# Patient Record
Sex: MALE | Race: WHITE | NOT HISPANIC OR LATINO | URBAN - METROPOLITAN AREA
[De-identification: names, ages, dates, MRNs, and addresses within clinical notes are randomized per-mention and may not be internally consistent; named-entity substitution may affect disease eponyms.]

---

## 2017-07-25 ENCOUNTER — OUTPATIENT (OUTPATIENT)
Dept: OUTPATIENT SERVICES | Facility: HOSPITAL | Age: 22
LOS: 1 days | End: 2017-07-25

## 2017-07-25 VITALS
HEART RATE: 85 BPM | SYSTOLIC BLOOD PRESSURE: 106 MMHG | RESPIRATION RATE: 16 BRPM | DIASTOLIC BLOOD PRESSURE: 70 MMHG | HEIGHT: 66 IN | TEMPERATURE: 98 F | OXYGEN SATURATION: 97 % | WEIGHT: 85.1 LBS

## 2017-07-25 DIAGNOSIS — G80.0 SPASTIC QUADRIPLEGIC CEREBRAL PALSY: Chronic | ICD-10-CM

## 2017-07-25 DIAGNOSIS — G80.9 CEREBRAL PALSY, UNSPECIFIED: ICD-10-CM

## 2017-07-25 DIAGNOSIS — K59.09 OTHER CONSTIPATION: ICD-10-CM

## 2017-07-25 DIAGNOSIS — G82.50 QUADRIPLEGIA, UNSPECIFIED: ICD-10-CM

## 2017-07-25 DIAGNOSIS — Z98.890 OTHER SPECIFIED POSTPROCEDURAL STATES: Chronic | ICD-10-CM

## 2017-07-25 DIAGNOSIS — Z99.3 DEPENDENCE ON WHEELCHAIR: ICD-10-CM

## 2017-07-25 DIAGNOSIS — R47.01 APHASIA: ICD-10-CM

## 2017-07-25 DIAGNOSIS — H47.619 CORTICAL BLINDNESS, UNSPECIFIED SIDE OF BRAIN: ICD-10-CM

## 2017-07-25 LAB
BUN SERPL-MCNC: 17 MG/DL — SIGNIFICANT CHANGE UP (ref 7–23)
CALCIUM SERPL-MCNC: 9.8 MG/DL — SIGNIFICANT CHANGE UP (ref 8.4–10.5)
CHLORIDE SERPL-SCNC: 100 MMOL/L — SIGNIFICANT CHANGE UP (ref 98–107)
CO2 SERPL-SCNC: 26 MMOL/L — SIGNIFICANT CHANGE UP (ref 22–31)
CREAT SERPL-MCNC: 0.57 MG/DL — SIGNIFICANT CHANGE UP (ref 0.5–1.3)
GLUCOSE SERPL-MCNC: 86 MG/DL — SIGNIFICANT CHANGE UP (ref 70–99)
HCT VFR BLD CALC: 47.2 % — SIGNIFICANT CHANGE UP (ref 39–50)
HGB BLD-MCNC: 15.2 G/DL — SIGNIFICANT CHANGE UP (ref 13–17)
MCHC RBC-ENTMCNC: 28.3 PG — SIGNIFICANT CHANGE UP (ref 27–34)
MCHC RBC-ENTMCNC: 32.2 % — SIGNIFICANT CHANGE UP (ref 32–36)
MCV RBC AUTO: 87.7 FL — SIGNIFICANT CHANGE UP (ref 80–100)
NRBC # FLD: 0 — SIGNIFICANT CHANGE UP
PLATELET # BLD AUTO: 158 K/UL — SIGNIFICANT CHANGE UP (ref 150–400)
PMV BLD: 15.4 FL — HIGH (ref 7–13)
POTASSIUM SERPL-MCNC: 4.1 MMOL/L — SIGNIFICANT CHANGE UP (ref 3.5–5.3)
POTASSIUM SERPL-SCNC: 4.1 MMOL/L — SIGNIFICANT CHANGE UP (ref 3.5–5.3)
RBC # BLD: 5.38 M/UL — SIGNIFICANT CHANGE UP (ref 4.2–5.8)
RBC # FLD: 13.5 % — SIGNIFICANT CHANGE UP (ref 10.3–14.5)
SODIUM SERPL-SCNC: 142 MMOL/L — SIGNIFICANT CHANGE UP (ref 135–145)
WBC # BLD: 7.77 K/UL — SIGNIFICANT CHANGE UP (ref 3.8–10.5)
WBC # FLD AUTO: 7.77 K/UL — SIGNIFICANT CHANGE UP (ref 3.8–10.5)

## 2017-07-25 RX ORDER — SODIUM CHLORIDE 9 MG/ML
3 INJECTION INTRAMUSCULAR; INTRAVENOUS; SUBCUTANEOUS EVERY 8 HOURS
Qty: 0 | Refills: 0 | Status: DISCONTINUED | OUTPATIENT
Start: 2017-08-10 | End: 2017-08-25

## 2017-07-25 RX ORDER — SODIUM CHLORIDE 9 MG/ML
1000 INJECTION, SOLUTION INTRAVENOUS
Qty: 0 | Refills: 0 | Status: DISCONTINUED | OUTPATIENT
Start: 2017-08-10 | End: 2017-08-25

## 2017-07-25 NOTE — H&P PST ADULT - NEGATIVE GENERAL GENITOURINARY SYMPTOMS
no urine discoloration/no renal colic/no urinary hesitancy/no bladder infections/no flank pain R/no flank pain L/no hematuria/no dysuria no hematuria/no bladder infections

## 2017-07-25 NOTE — H&P PST ADULT - PMH
Cerebral palsy    Chronic constipation    Cortical blindness    Nonverbal    Spastic quadriparesis    Wheelchair dependence

## 2017-07-25 NOTE — H&P PST ADULT - RS GEN PE MLT RESP DETAILS PC
good air movement/clear to auscultation bilaterally/breath sounds equal/airway patent/respirations non-labored

## 2017-07-25 NOTE — H&P PST ADULT - MEDICATION ADMINISTRATION INFO, PROFILE
crush pills for administration/requires elixir form/difficulty swallowing pills/meds either liquid or crushed and mixed with applesauce

## 2017-07-25 NOTE — H&P PST ADULT - MUSCULOSKELETAL COMMENTS
Spasticity to all limbs - has baclofen pump implant Spasticity to all limbs - has baclofen pump implant. H/o Quadriplegia

## 2017-07-25 NOTE — H&P PST ADULT - LIVES WITH, PROFILE
Residence group home - [facility] other relative/Lives in a residence group home - [facility]

## 2017-07-25 NOTE — H&P PST ADULT - NSANTHOSAYNRD_GEN_A_CORE
No. [place] screening performed.  STOP BANG Legend: 0-2 = LOW Risk; 3-4 = INTERMEDIATE Risk; 5-8 = HIGH Risk

## 2017-07-25 NOTE — H&P PST ADULT - LYMPHATIC
supraclavicular R/anterior cervical L/anterior cervical R/supraclavicular L/posterior cervical R/posterior cervical L

## 2017-07-25 NOTE — H&P PST ADULT - PROBLEM SELECTOR PLAN 1
End of Life Removal of Old Baclofen Pump, Insertion of New Baclofen Pump Without Laminectomy scheduled for 8/10/2017.  Pre-op instructions given to aid.   Spoke to SURINDER Contreras from the facility, she assisted with history - written instructions will be sent to facility.  Pepcid given for GI prophylaxis.  Chlorhexidine wash instructions given.  Medical clearance requested. End of Life Removal of Old Baclofen Pump, Insertion of New Baclofen Pump Without Laminectomy scheduled for 8/10/2017.  Pre-op instructions given to aid.   Spoke to SURINDER Contreras from the facility, she assisted with history - written instructions will be sent to facility.  Pepcid given for GI prophylaxis.  Chlorhexidine wash instructions given.  Medical clearance requested.  CONSENT: parents will be giving consent (as per Diane CADENA, confirmed via telephone) End of Life Removal of Old Baclofen Pump, Insertion of New Baclofen Pump Without Laminectomy scheduled for 8/10/2017.  Pre-op instructions given to aid.   Spoke to SURINDER Contreras from the facility, she assisted with history - written instructions sent to facility.  Pepcid given for GI prophylaxis.  Chlorhexidine wash instructions given.  Medical clearance requested.  CONSENT: parents will be giving consent (as per Diane CADENA, confirmed via telephone)

## 2017-07-25 NOTE — H&P PST ADULT - PSH
History of back surgery  Scoliosis repair 8/2011 with Woo rods CP (cerebral palsy), spastic, quadriplegic  Baclofen pump implant 5/2011  History of back surgery  Scoliosis repair 8/2011 with Woo rods

## 2017-07-25 NOTE — H&P PST ADULT - HISTORY OF PRESENT ILLNESS
23yo non-verbal, non-ambulatory male from [facility] Long term facility accompanied by aid. SURINDER Contreras was called she assisted with history. Pt has medical h/o Cerebral palsy, Cortical blindness, Spastic quadriplegia, Profound intellectual disability, and Scoliosis. Pt presents today for presurgical evaluation for End of Life Removal of Old Baclofen Pump, Insertion of New Baclofen Pump Without Laminectomy scheduled for 8/10/2017. 23yo non-verbal, non-ambulatory male from [facility] Long term facility accompanied by aid. SURINDER Contreras from [facility] facility assisted with history via telephone. Pt has medical h/o Cerebral palsy, Cortical blindness, Spastic quadriplegia, Profound intellectual disability, and Scoliosis. Pt presents today for presurgical evaluation for End of Life Removal of Old Baclofen Pump, Insertion of New Baclofen Pump Without Laminectomy scheduled for 8/10/2017.

## 2017-07-25 NOTE — H&P PST ADULT - NEUROLOGICAL COMMENTS
Wheelchair bound - spasticity to all limbs Wheelchair bound - spasticity to all limbs, h/o quadriplegia

## 2017-07-25 NOTE — H&P PST ADULT - REASON FOR ADMISSION
pt non-verbal - records showed encounter planned for baclofen pump removal pt non-verbal - records shows encounter for planned baclofen pump removal

## 2017-08-10 ENCOUNTER — TRANSCRIPTION ENCOUNTER (OUTPATIENT)
Age: 22
End: 2017-08-10

## 2017-08-10 ENCOUNTER — OUTPATIENT (OUTPATIENT)
Dept: OUTPATIENT SERVICES | Facility: HOSPITAL | Age: 22
LOS: 1 days | Discharge: HOME | End: 2017-08-10

## 2017-08-10 VITALS
WEIGHT: 85.1 LBS | RESPIRATION RATE: 16 BRPM | OXYGEN SATURATION: 97 % | DIASTOLIC BLOOD PRESSURE: 75 MMHG | SYSTOLIC BLOOD PRESSURE: 124 MMHG | TEMPERATURE: 98 F | HEIGHT: 66 IN | HEART RATE: 92 BPM

## 2017-08-10 VITALS
TEMPERATURE: 98 F | HEART RATE: 80 BPM | SYSTOLIC BLOOD PRESSURE: 154 MMHG | RESPIRATION RATE: 16 BRPM | DIASTOLIC BLOOD PRESSURE: 93 MMHG | OXYGEN SATURATION: 98 %

## 2017-08-10 DIAGNOSIS — G80.0 SPASTIC QUADRIPLEGIC CEREBRAL PALSY: Chronic | ICD-10-CM

## 2017-08-10 DIAGNOSIS — Z98.890 OTHER SPECIFIED POSTPROCEDURAL STATES: Chronic | ICD-10-CM

## 2017-08-10 DIAGNOSIS — G80.9 CEREBRAL PALSY, UNSPECIFIED: ICD-10-CM

## 2017-08-10 RX ORDER — FENTANYL CITRATE 50 UG/ML
25 INJECTION INTRAVENOUS
Qty: 0 | Refills: 0 | Status: DISCONTINUED | OUTPATIENT
Start: 2017-08-10 | End: 2017-08-10

## 2017-08-10 RX ORDER — BACLOFEN 100 %
300 POWDER (GRAM) MISCELLANEOUS DAILY
Qty: 0 | Refills: 0 | Status: DISCONTINUED | OUTPATIENT
Start: 2017-08-10 | End: 2017-08-25

## 2017-08-10 RX ORDER — SODIUM CHLORIDE 9 MG/ML
1000 INJECTION INTRAMUSCULAR; INTRAVENOUS; SUBCUTANEOUS
Qty: 0 | Refills: 0 | Status: DISCONTINUED | OUTPATIENT
Start: 2017-08-10 | End: 2017-08-25

## 2017-08-10 RX ADMIN — FENTANYL CITRATE 25 MICROGRAM(S): 50 INJECTION INTRAVENOUS at 12:12

## 2017-08-10 NOTE — ASU DISCHARGE PLAN (ADULT/PEDIATRIC). - MEDICATION SUMMARY - MEDICATIONS TO TAKE
I will START or STAY ON the medications listed below when I get home from the hospital:    proteinex- 18  -- 30 milliliter(s) by mouth once a day in am  -- Indication: For Cerebral palsy    fiber-stat  -- 1 ounce by mouth once a day in am  -- Indication: For Cerebral palsy    acetaminophen 160 mg/5 mL oral liquid  -- 20 milliliter(s) by mouth every 6 hours, As Needed  -- Indication: For Cerebral palsy    ibuprofen 100 mg/5 mL oral suspension  -- 10 milliliter(s) by mouth every 6 hours, As Needed. Last dose 8/2/2017  -- Indication: For Cerebral palsy    calcium carbonate 1250 mg/5 mL (100 mg/mL elemental calcium) oral suspension  -- 5 milliliter(s) by mouth once a day in am  -- Indication: For Cerebral palsy    metoclopramide 5 mg oral tablet  -- 1 tab(s) by mouth 3 times a day, As Needed  -- Indication: For Cerebral palsy    albuterol 2.5 mg/3 mL (0.083%) inhalation solution  -- 3 milliliter(s) inhaled every 6 hours, As Needed  -- Indication: For Cerebral palsy    Eucerin topical lotion  -- Apply on skin to affected area 2 times a day  -- Indication: For Cerebral palsy    Vitamin A & D topical ointment  -- Apply on skin to affected area prn with diaper changes  -- Indication: For Cerebral palsy    clobetasol 0.05% topical solution  -- Apply on skin to affected area 2 times a day- to scalp  -- Indication: For Cerebral palsy    clindamycin 1% topical lotion  -- Apply on skin to affected area 2 times a day to face  -- Indication: For Cerebral palsy    ketoconazole 2% topical shampoo  -- Apply on skin to affected area 3 times a week in pm  -- Indication: For Cerebral palsy    Fleet Enema  -- 1  enema given rectally on tuesdays and fridays and PRN  -- Indication: For Cerebral palsy    bisacodyl 10 mg rectal suppository  -- 1 suppository(ies) rectally once a day, As Needed if no BM in 72 hours  -- Indication: For Cerebral palsy    senna 8.6 mg oral tablet  -- 2 tab(s) by mouth once a day (at bedtime)  -- Indication: For Cerebral palsy    polyethylene glycol 3350 oral powder for reconstitution  -- 17 gram(s) by mouth once a day in pm  -- Indication: For Cerebral palsy    lactulose 10 g/15 mL oral syrup  -- 30 milliliter(s) by mouth 2 times a day  -- Indication: For Cerebral palsy    Biotene Mouthwash oral solution  --  by mouth 2 times a day, As Needed  -- Indication: For Cerebral palsy    omeprazole 20 mg oral delayed release tablet  -- 1 tab(s) by mouth once a day in am  -- Indication: For Cerebral palsy    multivitamin  -- 15 milliliter(s) by mouth once a day in am last dose 8/2/17  -- Indication: For Cerebral palsy    ergocalciferol 8000 intl units/mL oral solution  -- 2 droppersful  by mouth 2 times a day  -- Indication: For Cerebral palsy

## 2017-08-10 NOTE — ASU DISCHARGE PLAN (ADULT/PEDIATRIC). - CONDITIONS AT DISCHARGE
Verbalizing good understanding of discharge instructions and medication review.Discharge criteria met.  Cleared for discharge home by anesthesia.  Escorted to entrance  discharged home. Patient voided- incontinent.

## 2017-08-10 NOTE — ASU DISCHARGE PLAN (ADULT/PEDIATRIC). - POST OP PHONE #
[phone] pt. granted permission to leave message /and or speak with whoever answers the phone. or [phone] ( residence )  EVON manager  pt. granted permission to leave message /and or speak with whoever answers the phone. .call

## 2017-08-10 NOTE — ASU PREOP CHECKLIST - STERILIZATION AFFIRMATION
Refill request for Prednisone 10 mg, 1 tab daily    Last seen on 11/8/16, has appointment schedule on 5/16/17    Script set to e-prescribe if approved.    n/a

## 2017-10-02 ENCOUNTER — APPOINTMENT (OUTPATIENT)
Dept: NEUROLOGY | Facility: CLINIC | Age: 22
End: 2017-10-02
Payer: MEDICAID

## 2017-10-02 VITALS — HEART RATE: 102 BPM | SYSTOLIC BLOOD PRESSURE: 145 MMHG | DIASTOLIC BLOOD PRESSURE: 98 MMHG | HEIGHT: 66 IN

## 2017-10-02 DIAGNOSIS — G80.9 CEREBRAL PALSY, UNSPECIFIED: ICD-10-CM

## 2017-10-02 DIAGNOSIS — G40.909 EPILEPSY, UNSPECIFIED, NOT INTRACTABLE, W/OUT STATUS EPILEPTICUS: ICD-10-CM

## 2017-10-02 PROCEDURE — 99214 OFFICE O/P EST MOD 30 MIN: CPT

## 2018-04-03 ENCOUNTER — EMERGENCY (EMERGENCY)
Facility: HOSPITAL | Age: 23
LOS: 1 days | Discharge: HOME | End: 2018-04-03
Attending: EMERGENCY MEDICINE | Admitting: EMERGENCY MEDICINE
Payer: MEDICAID

## 2018-04-03 VITALS
SYSTOLIC BLOOD PRESSURE: 118 MMHG | HEART RATE: 86 BPM | DIASTOLIC BLOOD PRESSURE: 85 MMHG | RESPIRATION RATE: 16 BRPM | OXYGEN SATURATION: 100 %

## 2018-04-03 VITALS
DIASTOLIC BLOOD PRESSURE: 55 MMHG | HEART RATE: 66 BPM | RESPIRATION RATE: 20 BRPM | SYSTOLIC BLOOD PRESSURE: 101 MMHG | OXYGEN SATURATION: 100 %

## 2018-04-03 DIAGNOSIS — G80.0 SPASTIC QUADRIPLEGIC CEREBRAL PALSY: Chronic | ICD-10-CM

## 2018-04-03 DIAGNOSIS — Z98.890 OTHER SPECIFIED POSTPROCEDURAL STATES: Chronic | ICD-10-CM

## 2018-04-03 LAB
ALBUMIN SERPL ELPH-MCNC: 4.1 G/DL — SIGNIFICANT CHANGE UP (ref 3.3–5)
ALP SERPL-CCNC: 81 U/L — SIGNIFICANT CHANGE UP (ref 40–120)
ALT FLD-CCNC: 18 U/L — SIGNIFICANT CHANGE UP (ref 4–41)
AMORPH CRY # UR COMP ASSIST: SIGNIFICANT CHANGE UP (ref 0–0)
APPEARANCE UR: SIGNIFICANT CHANGE UP
AST SERPL-CCNC: 14 U/L — SIGNIFICANT CHANGE UP (ref 4–40)
BASE EXCESS BLDV CALC-SCNC: 4.5 MMOL/L — SIGNIFICANT CHANGE UP
BASOPHILS # BLD AUTO: 0.01 K/UL — SIGNIFICANT CHANGE UP (ref 0–0.2)
BASOPHILS NFR BLD AUTO: 0.2 % — SIGNIFICANT CHANGE UP (ref 0–2)
BILIRUB SERPL-MCNC: 0.3 MG/DL — SIGNIFICANT CHANGE UP (ref 0.2–1.2)
BILIRUB UR-MCNC: NEGATIVE — SIGNIFICANT CHANGE UP
BLOOD GAS VENOUS - CREATININE: 0.39 MG/DL — LOW (ref 0.5–1.3)
BLOOD UR QL VISUAL: NEGATIVE — SIGNIFICANT CHANGE UP
BUN SERPL-MCNC: 20 MG/DL — SIGNIFICANT CHANGE UP (ref 7–23)
CALCIUM SERPL-MCNC: 8.8 MG/DL — SIGNIFICANT CHANGE UP (ref 8.4–10.5)
CHLORIDE BLDV-SCNC: 103 MMOL/L — SIGNIFICANT CHANGE UP (ref 96–108)
CHLORIDE SERPL-SCNC: 102 MMOL/L — SIGNIFICANT CHANGE UP (ref 98–107)
CO2 SERPL-SCNC: 31 MMOL/L — SIGNIFICANT CHANGE UP (ref 22–31)
COLOR SPEC: YELLOW — SIGNIFICANT CHANGE UP
CREAT SERPL-MCNC: 0.61 MG/DL — SIGNIFICANT CHANGE UP (ref 0.5–1.3)
EOSINOPHIL # BLD AUTO: 0.03 K/UL — SIGNIFICANT CHANGE UP (ref 0–0.5)
EOSINOPHIL NFR BLD AUTO: 0.5 % — SIGNIFICANT CHANGE UP (ref 0–6)
GAS PNL BLDV: 138 MMOL/L — SIGNIFICANT CHANGE UP (ref 136–146)
GLUCOSE BLDV-MCNC: 93 — SIGNIFICANT CHANGE UP (ref 70–99)
GLUCOSE SERPL-MCNC: 94 MG/DL — SIGNIFICANT CHANGE UP (ref 70–99)
GLUCOSE UR-MCNC: NEGATIVE — SIGNIFICANT CHANGE UP
HCO3 BLDV-SCNC: 26 MMOL/L — SIGNIFICANT CHANGE UP (ref 20–27)
HCT VFR BLD CALC: 44.4 % — SIGNIFICANT CHANGE UP (ref 39–50)
HCT VFR BLDV CALC: 45.4 % — SIGNIFICANT CHANGE UP (ref 39–51)
HGB BLD-MCNC: 14.2 G/DL — SIGNIFICANT CHANGE UP (ref 13–17)
HGB BLDV-MCNC: 14.8 G/DL — SIGNIFICANT CHANGE UP (ref 13–17)
IMM GRANULOCYTES # BLD AUTO: 0.01 # — SIGNIFICANT CHANGE UP
IMM GRANULOCYTES NFR BLD AUTO: 0.2 % — SIGNIFICANT CHANGE UP (ref 0–1.5)
KETONES UR-MCNC: NEGATIVE — SIGNIFICANT CHANGE UP
LACTATE BLDV-MCNC: 1.8 MMOL/L — SIGNIFICANT CHANGE UP (ref 0.5–2)
LEUKOCYTE ESTERASE UR-ACNC: NEGATIVE — SIGNIFICANT CHANGE UP
LYMPHOCYTES # BLD AUTO: 1.99 K/UL — SIGNIFICANT CHANGE UP (ref 1–3.3)
LYMPHOCYTES # BLD AUTO: 35.4 % — SIGNIFICANT CHANGE UP (ref 13–44)
MCHC RBC-ENTMCNC: 28.8 PG — SIGNIFICANT CHANGE UP (ref 27–34)
MCHC RBC-ENTMCNC: 32 % — SIGNIFICANT CHANGE UP (ref 32–36)
MCV RBC AUTO: 90.1 FL — SIGNIFICANT CHANGE UP (ref 80–100)
MONOCYTES # BLD AUTO: 0.39 K/UL — SIGNIFICANT CHANGE UP (ref 0–0.9)
MONOCYTES NFR BLD AUTO: 6.9 % — SIGNIFICANT CHANGE UP (ref 2–14)
MUCOUS THREADS # UR AUTO: SIGNIFICANT CHANGE UP
NEUTROPHILS # BLD AUTO: 3.19 K/UL — SIGNIFICANT CHANGE UP (ref 1.8–7.4)
NEUTROPHILS NFR BLD AUTO: 56.8 % — SIGNIFICANT CHANGE UP (ref 43–77)
NITRITE UR-MCNC: NEGATIVE — SIGNIFICANT CHANGE UP
NRBC # FLD: 0 — SIGNIFICANT CHANGE UP
PCO2 BLDV: 60 MMHG — HIGH (ref 41–51)
PH BLDV: 7.33 PH — SIGNIFICANT CHANGE UP (ref 7.32–7.43)
PH UR: 7 — SIGNIFICANT CHANGE UP (ref 5–8)
PLATELET # BLD AUTO: 174 K/UL — SIGNIFICANT CHANGE UP (ref 150–400)
PMV BLD: 12.5 FL — SIGNIFICANT CHANGE UP (ref 7–13)
PO2 BLDV: 40 MMHG — SIGNIFICANT CHANGE UP (ref 35–40)
POTASSIUM BLDV-SCNC: 3.7 MMOL/L — SIGNIFICANT CHANGE UP (ref 3.4–4.5)
POTASSIUM SERPL-MCNC: 3.8 MMOL/L — SIGNIFICANT CHANGE UP (ref 3.5–5.3)
POTASSIUM SERPL-SCNC: 3.8 MMOL/L — SIGNIFICANT CHANGE UP (ref 3.5–5.3)
PROT SERPL-MCNC: 7 G/DL — SIGNIFICANT CHANGE UP (ref 6–8.3)
PROT UR-MCNC: NEGATIVE MG/DL — SIGNIFICANT CHANGE UP
RBC # BLD: 4.93 M/UL — SIGNIFICANT CHANGE UP (ref 4.2–5.8)
RBC # FLD: 13.8 % — SIGNIFICANT CHANGE UP (ref 10.3–14.5)
RBC CASTS # UR COMP ASSIST: SIGNIFICANT CHANGE UP (ref 0–?)
SAO2 % BLDV: 66 % — SIGNIFICANT CHANGE UP (ref 60–85)
SODIUM SERPL-SCNC: 140 MMOL/L — SIGNIFICANT CHANGE UP (ref 135–145)
SP GR SPEC: 1.02 — SIGNIFICANT CHANGE UP (ref 1–1.04)
UROBILINOGEN FLD QL: 0.2 MG/DL — SIGNIFICANT CHANGE UP
WBC # BLD: 5.62 K/UL — SIGNIFICANT CHANGE UP (ref 3.8–10.5)
WBC # FLD AUTO: 5.62 K/UL — SIGNIFICANT CHANGE UP (ref 3.8–10.5)

## 2018-04-03 PROCEDURE — 93010 ELECTROCARDIOGRAM REPORT: CPT

## 2018-04-03 PROCEDURE — 70450 CT HEAD/BRAIN W/O DYE: CPT | Mod: 26

## 2018-04-03 PROCEDURE — 99285 EMERGENCY DEPT VISIT HI MDM: CPT | Mod: 25

## 2018-04-03 PROCEDURE — 71045 X-RAY EXAM CHEST 1 VIEW: CPT | Mod: 26

## 2018-04-03 NOTE — ED PROVIDER NOTE - PLAN OF CARE
Follow up with your PMD within one week and with a neurologist at the next available appointment.   Return to the ED if there are any headaches, seizures, or any other concerning symptoms.

## 2018-04-03 NOTE — ED ADULT TRIAGE NOTE - CHIEF COMPLAINT QUOTE
Pt from [facility] Sent here for multiple seizures.  Given midazolam 6 sprays, 5mg/mL given in 2 sprays x3.  Pt post ictal.  Multiple grand mal seizures.  Received with #18g SL left arm.   mL by EMS.  O2 100% NRB

## 2018-04-03 NOTE — ED PROVIDER NOTE - CARE PLAN
Principal Discharge DX:	Seizure  Assessment and plan of treatment:	Follow up with your PMD within one week and with a neurologist at the next available appointment.   Return to the ED if there are any headaches, seizures, or any other concerning symptoms.

## 2018-04-03 NOTE — ED PROVIDER NOTE - OBJECTIVE STATEMENT
23M PMH MR, CP, non-verbal, s/p baclofen pump 23M PMH MR, CP, non-verbal, s/p baclofen pump p/w grand mal seizure this AM s/p 6 sprays of intranasal midazolam given by staff. Per EMS pt was post ictal upon arrival, no evidence of seizure activity during transport. Pt was found in wheelchair and was not witnessed to fall. Pts aide is at bedside; he reports that he has been well the past few days, eating normally, no fevers, chills, abd pain, n/v/d. Per neurology note pt has not had a seizure in many years and is not on any anti-epileptics.

## 2018-04-03 NOTE — ED PROVIDER NOTE - ATTENDING CONTRIBUTION TO CARE
Attending Statement: I have personally seen and examined this patient. I have fully participated in the care of this patient. I have reviewed all pertinent clinical information, including history physical exam, plan and the Resident's note and agree except as noted  22yo M BIBEMS sp seizure this am. EMS endorse staff said pt had a grand mal seizure, given 6 sprays of intranasal midazolam to stop the seizures. EMS state pt hasn't had a seizure in a hour. Pt is post ictal. pt was in his wheelchair. HX of MR CP, nonverbal, non ambulatory, wheel chair bound sp spastic quadripareses  with a baclofen pump. EMS states that staff state last seizure was a year ago, Aide at bedside state he has been with pt for three years and hasn't had a seizure in that time. Has midazolam prn. no recent fever/vomit/diarrhea. "ok" yesterday.  Vital signs noted. rectal temp 97 FS 99 pt snoring, no sign of head trauma. supple neck. normal S1-S2 soft nondistended abdomen. no bruising of chest/abdomen. atrophy thin lower ext. no swelling. pt nonverbal at baseline.   plan seizure precaution, end tidal, tele monitoring, labs, urine, cxr, ct head, neurology cs, admit

## 2018-04-03 NOTE — ED ADULT NURSE NOTE - OBJECTIVE STATEMENT
Pt. alert and responsive to stimuli but non-verbal. Coming from group home with aide at bedside for support. Presents to ER for seizures. EMS states pt. had a grand mal seizure around 8am. Hasn't had one in 5-6 years.  Post-ictal upon EMS arrival. Now pt. back to baseline as per aide. Not on any current medications. h/o CP and MR- wheelchair bound. Skin intact. Baclofen pump noted on exam in RLQ. #20g IV placed by EMS in left FA, labs sent as ordered. MD at bedside for eval. Straight cath for urine specimen. VS done as charted, breathing well on 2L NC. Will continue to monitor.

## 2018-04-03 NOTE — ED PROVIDER NOTE - MEDICAL DECISION MAKING DETAILS
23M PMH CP, MR, non verbal, s/p baclofen pump p/w grand mal seizure at group home s/p midazolam. Will check CT-H, CXR, CBC CMP UA, neuro consult, likely admit for EEG monitoring

## 2018-04-03 NOTE — CONSULT NOTE ADULT - SUBJECTIVE AND OBJECTIVE BOX
Neurology Consult    Name  PATI CONTRERAS    Patient is a 23 year old Male w/ PMHx of MR, CP, non-verbal, s/p baclofen pump p/w possible seizure event this AM s/p 6 sprays of intranasal midazolam given by staff. From chart report from EMS that pt was post ictal upon arrival, no evidence of seizure activity during transport. Pt was found in wheelchair and was not witnessed to fall. Per patient's aide who was at bedside, the episode was described as patient's eyes rolling back and around, with some UE stiffening.  Patient did not have any tongue lacerations, and is incontinent at baseline.  From outpatient neurology reports, patient has had extensive EEG monitoring which has not shown any epileptiform activity, and seizure history is unclear.                                                          MEDICATIONS  (STANDING):    MEDICATIONS  (PRN):      Allergies    No Known Allergies    Intolerances        Objective  Vital Signs Last 24 Hrs  T(C): 36.3 (03 Apr 2018 09:50), Max: 36.3 (03 Apr 2018 09:50)  T(F): 97.3 (03 Apr 2018 09:50), Max: 97.3 (03 Apr 2018 09:50)  HR: 71 (03 Apr 2018 09:50) (66 - 71)  BP: 118/58 (03 Apr 2018 09:50) (101/55 - 118/58)  BP(mean): --  RR: 16 (03 Apr 2018 09:50) (16 - 20)  SpO2: 98% (03 Apr 2018 09:50) (98% - 100%)    General Exam   General appearance: No acute distress, well-nourished  Respiratory:    non-labored respirations               Neurological Exam  Orientation: disoriented to person, disoriented to place and disoriented to time.   Language: fluency not intact and comprehension not intact.   Motor Strength:. the patient was quadriplegic. spastic   Coordination:. w/c bound.   Deep tendon reflexes:   Biceps right 1+. Biceps left 1+.    Patella right 2+. Patella left 2+.   Limited by increased tone diffusely.      Other Studies    04-03    140  |  102  |  20  ----------------------------<  94  3.8   |  31  |  0.61    Ca    8.8      03 Apr 2018 09:50    TPro  7.0  /  Alb  4.1  /  TBili  0.3  /  DBili  x   /  AST  14  /  ALT  18  /  AlkPhos  81  04-03    04-03    140  |  102  |  20  ----------------------------<  94  3.8   |  31  |  0.61    Ca    8.8      03 Apr 2018 09:50    TPro  7.0  /  Alb  4.1  /  TBili  0.3  /  DBili  x   /  AST  14  /  ALT  18  /  AlkPhos  81  04-03    LIVER FUNCTIONS - ( 03 Apr 2018 09:50 )  Alb: 4.1 g/dL / Pro: 7.0 g/dL / ALK PHOS: 81 u/L / ALT: 18 u/L / AST: 14 u/L / GGT: x             Radiology    CTH:  Impression: Stable exam when allowing for differences in technique..

## 2018-04-03 NOTE — ED ADULT NURSE NOTE - CHIEF COMPLAINT QUOTE
Pt from [facility] Sent here for multiple seizures.  Given midazolam 6 sprays, 5mg/mL given in 2 sprays x3.  Pt post ictal.  Multiple grand mal seizures.  Received with #18g SL left arm.   mL by EMS.  O2 100% NRB

## 2018-04-03 NOTE — ED PROVIDER NOTE - PROGRESS NOTE DETAILS
Miguel Ángel PGY-2: Pt seen and evaluated by neurology who recommend outpatient followup. Pt back to baseline per caretaker at bedside. Transport arranged for 4PM

## 2018-04-03 NOTE — ED PROVIDER NOTE - PSH
CP (cerebral palsy), spastic, quadriplegic  Baclofen pump implant 5/2011  History of back surgery  Scoliosis repair 8/2011 with Woo rods

## 2018-04-03 NOTE — CONSULT NOTE ADULT - ASSESSMENT
23 year old Male w/ PMHx of MR, CP, non-verbal, s/p baclofen pump p/w possible seizure event this AM s/p 6 sprays of intranasal midazolam.  Neurological exam is at baseline for patient.  CTH showing no acute abnormalities.  Unclear Hx of seizures, from previous outpatient reports.      Plan:  - no indication to start AEDs at this time  - If further activity or change in mental status occurs, may consider prolonged EEG monitoring  - followup with epilepsy neurology for further assessment

## 2018-04-03 NOTE — ED ADULT NURSE REASSESSMENT NOTE - NS ED NURSE REASSESS COMMENT FT1
pt. lying in bed comfortably with aide at bedside. SW set up p/u at 4pm. No complaints at this time. VSS. Will continue to monitor.

## 2018-04-03 NOTE — PROVIDER CONTACT NOTE (OTHER) - ASSESSMENT
t stable to return to his home.  THOR called for [facility] for amublance.  [facility] to transport the patient home.  P/U 4pm, ref# [id number]

## 2018-04-04 LAB — SPECIMEN SOURCE: SIGNIFICANT CHANGE UP

## 2018-04-05 LAB — BACTERIA UR CULT: SIGNIFICANT CHANGE UP

## 2018-06-29 NOTE — ED PROVIDER NOTE - NSTIMEPROVIDERCAREINITIATE_GEN_ER
03-Apr-2018 09:26 No Is This A New Presentation, Or A Follow-Up?: Growth Has Your Skin Lesion Been Treated?: not been treated

## 2019-07-11 NOTE — ED PROVIDER NOTE - AXIS
Panel Management Review      Patient has the following on her problem list:     Asthma review     ACT Total Scores 5/14/2019   ACT TOTAL SCORE (Goal Greater than or Equal to 20) 17   In the past 12 months, how many times did you visit the emergency room for your asthma without being admitted to the hospital? 0   In the past 12 months, how many times were you hospitalized overnight because of your asthma? 0      1. Is Asthma diagnosis on the Problem List? Yes    2. Is Asthma listed on Health Maintenance? Yes    3. Patient is due for:  ACT      Composite cancer screening  Chart review shows that this patient is due/due soon for the following Pap Smear (recently send reminder letter from OB clinic)  Summary:    Patient is due/failing the following:   ACT    Action needed:   Patient needs to do ACT.    Type of outreach:    Letter mailed with ACT for the patient to complete and return.    Questions for provider review:    None                                                                                                                                    TALI Vides MA               
Normal

## 2020-08-12 ENCOUNTER — INPATIENT (INPATIENT)
Facility: HOSPITAL | Age: 25
LOS: 3 days | Discharge: NURSING, REHAB OR LONG-TERM CARE | End: 2020-08-16
Attending: HOSPITALIST | Admitting: HOSPITALIST
Payer: MEDICAID

## 2020-08-12 VITALS
OXYGEN SATURATION: 100 % | HEART RATE: 97 BPM | SYSTOLIC BLOOD PRESSURE: 107 MMHG | TEMPERATURE: 99 F | RESPIRATION RATE: 20 BRPM | DIASTOLIC BLOOD PRESSURE: 56 MMHG

## 2020-08-12 DIAGNOSIS — G80.0 SPASTIC QUADRIPLEGIC CEREBRAL PALSY: Chronic | ICD-10-CM

## 2020-08-12 DIAGNOSIS — Z98.890 OTHER SPECIFIED POSTPROCEDURAL STATES: Chronic | ICD-10-CM

## 2020-08-12 LAB
ANION GAP SERPL CALC-SCNC: 13 MMO/L — SIGNIFICANT CHANGE UP (ref 7–14)
APPEARANCE UR: CLEAR — SIGNIFICANT CHANGE UP
APTT BLD: 31.4 SEC — SIGNIFICANT CHANGE UP (ref 27–36.3)
BACTERIA # UR AUTO: NEGATIVE — SIGNIFICANT CHANGE UP
BASE EXCESS BLDV CALC-SCNC: 6.3 MMOL/L — SIGNIFICANT CHANGE UP
BASOPHILS # BLD AUTO: 0.01 K/UL — SIGNIFICANT CHANGE UP (ref 0–0.2)
BASOPHILS NFR BLD AUTO: 0.1 % — SIGNIFICANT CHANGE UP (ref 0–2)
BILIRUB UR-MCNC: NEGATIVE — SIGNIFICANT CHANGE UP
BLOOD GAS VENOUS - CREATININE: 0.71 MG/DL — SIGNIFICANT CHANGE UP (ref 0.5–1.3)
BLOOD UR QL VISUAL: NEGATIVE — SIGNIFICANT CHANGE UP
BUN SERPL-MCNC: 26 MG/DL — HIGH (ref 7–23)
CALCIUM SERPL-MCNC: 10 MG/DL — SIGNIFICANT CHANGE UP (ref 8.4–10.5)
CHLORIDE BLDV-SCNC: 110 MMOL/L — HIGH (ref 96–108)
CHLORIDE SERPL-SCNC: 108 MMOL/L — HIGH (ref 98–107)
CO2 SERPL-SCNC: 26 MMOL/L — SIGNIFICANT CHANGE UP (ref 22–31)
COLOR SPEC: YELLOW — SIGNIFICANT CHANGE UP
CREAT SERPL-MCNC: 0.69 MG/DL — SIGNIFICANT CHANGE UP (ref 0.5–1.3)
EOSINOPHIL # BLD AUTO: 0.02 K/UL — SIGNIFICANT CHANGE UP (ref 0–0.5)
EOSINOPHIL NFR BLD AUTO: 0.2 % — SIGNIFICANT CHANGE UP (ref 0–6)
GAS PNL BLDV: 144 MMOL/L — SIGNIFICANT CHANGE UP (ref 136–146)
GLUCOSE BLDV-MCNC: 102 MG/DL — HIGH (ref 70–99)
GLUCOSE SERPL-MCNC: 110 MG/DL — HIGH (ref 70–99)
GLUCOSE UR-MCNC: NEGATIVE — SIGNIFICANT CHANGE UP
HCO3 BLDV-SCNC: 28 MMOL/L — HIGH (ref 20–27)
HCT VFR BLD CALC: 43.1 % — SIGNIFICANT CHANGE UP (ref 39–50)
HCT VFR BLDV CALC: 43.1 % — SIGNIFICANT CHANGE UP (ref 39–51)
HGB BLD-MCNC: 13.5 G/DL — SIGNIFICANT CHANGE UP (ref 13–17)
HGB BLDV-MCNC: 14 G/DL — SIGNIFICANT CHANGE UP (ref 13–17)
IMM GRANULOCYTES NFR BLD AUTO: 0.4 % — SIGNIFICANT CHANGE UP (ref 0–1.5)
INR BLD: 1.27 — HIGH (ref 0.88–1.16)
KETONES UR-MCNC: SIGNIFICANT CHANGE UP
LACTATE BLDV-MCNC: 1.5 MMOL/L — SIGNIFICANT CHANGE UP (ref 0.5–2)
LEUKOCYTE ESTERASE UR-ACNC: NEGATIVE — SIGNIFICANT CHANGE UP
LYMPHOCYTES # BLD AUTO: 19.4 % — SIGNIFICANT CHANGE UP (ref 13–44)
LYMPHOCYTES # BLD AUTO: 2 K/UL — SIGNIFICANT CHANGE UP (ref 1–3.3)
MCHC RBC-ENTMCNC: 27.9 PG — SIGNIFICANT CHANGE UP (ref 27–34)
MCHC RBC-ENTMCNC: 31.3 % — LOW (ref 32–36)
MCV RBC AUTO: 89 FL — SIGNIFICANT CHANGE UP (ref 80–100)
MONOCYTES # BLD AUTO: 0.66 K/UL — SIGNIFICANT CHANGE UP (ref 0–0.9)
MONOCYTES NFR BLD AUTO: 6.4 % — SIGNIFICANT CHANGE UP (ref 2–14)
NEUTROPHILS # BLD AUTO: 7.57 K/UL — HIGH (ref 1.8–7.4)
NEUTROPHILS NFR BLD AUTO: 73.5 % — SIGNIFICANT CHANGE UP (ref 43–77)
NITRITE UR-MCNC: NEGATIVE — SIGNIFICANT CHANGE UP
NRBC # FLD: 0 K/UL — SIGNIFICANT CHANGE UP (ref 0–0)
PCO2 BLDV: 59 MMHG — HIGH (ref 41–51)
PH BLDV: 7.35 PH — SIGNIFICANT CHANGE UP (ref 7.32–7.43)
PH UR: 6 — SIGNIFICANT CHANGE UP (ref 5–8)
PLATELET # BLD AUTO: 228 K/UL — SIGNIFICANT CHANGE UP (ref 150–400)
PMV BLD: 12.8 FL — SIGNIFICANT CHANGE UP (ref 7–13)
PO2 BLDV: 32 MMHG — LOW (ref 35–40)
POTASSIUM BLDV-SCNC: 3.8 MMOL/L — SIGNIFICANT CHANGE UP (ref 3.4–4.5)
POTASSIUM SERPL-MCNC: 4.9 MMOL/L — SIGNIFICANT CHANGE UP (ref 3.5–5.3)
POTASSIUM SERPL-SCNC: 4.9 MMOL/L — SIGNIFICANT CHANGE UP (ref 3.5–5.3)
PROT UR-MCNC: 50 — SIGNIFICANT CHANGE UP
PROTHROM AB SERPL-ACNC: 14.3 SEC — HIGH (ref 10.6–13.6)
RBC # BLD: 4.84 M/UL — SIGNIFICANT CHANGE UP (ref 4.2–5.8)
RBC # FLD: 13.3 % — SIGNIFICANT CHANGE UP (ref 10.3–14.5)
RBC CASTS # UR COMP ASSIST: SIGNIFICANT CHANGE UP (ref 0–?)
SAO2 % BLDV: 52.2 % — LOW (ref 60–85)
SODIUM SERPL-SCNC: 147 MMOL/L — HIGH (ref 135–145)
SP GR SPEC: 1.04 — SIGNIFICANT CHANGE UP (ref 1–1.04)
SQUAMOUS # UR AUTO: SIGNIFICANT CHANGE UP
UROBILINOGEN FLD QL: SIGNIFICANT CHANGE UP
WBC # BLD: 10.3 K/UL — SIGNIFICANT CHANGE UP (ref 3.8–10.5)
WBC # FLD AUTO: 10.3 K/UL — SIGNIFICANT CHANGE UP (ref 3.8–10.5)
WBC UR QL: SIGNIFICANT CHANGE UP (ref 0–?)

## 2020-08-12 PROCEDURE — 99285 EMERGENCY DEPT VISIT HI MDM: CPT

## 2020-08-12 PROCEDURE — 71045 X-RAY EXAM CHEST 1 VIEW: CPT | Mod: 26

## 2020-08-12 RX ORDER — ACETAMINOPHEN 500 MG
650 TABLET ORAL ONCE
Refills: 0 | Status: COMPLETED | OUTPATIENT
Start: 2020-08-12 | End: 2020-08-12

## 2020-08-12 RX ADMIN — Medication 650 MILLIGRAM(S): at 21:33

## 2020-08-12 NOTE — ED PROVIDER NOTE - PHYSICAL EXAMINATION
General: sleeping comfortably, non verbal, minimal movement, vitals reassuring   Head: atraumatic, normocephalic  Eyes: PERRL, EOMI, no scleral icterus  ENT: normal phonation, airway patent  Neck: full ROM, no midline ttp  CV: RRR, no murmurs, HDS  Pulm: lungs CTA b/l, no wheezing, no respiratory distress, normal spo2 on RA  GI: abd soft, non tender, no guarding/rebound/masses  Back: no signs of trauma  Extremities:, contractures BUE, joints stable, distal pulses intact, no edema  Neuro: awake, non verbal contracted BUE, no movement in BLE, PERRL, at baseline   Derm: warm, dry, normal color

## 2020-08-12 NOTE — ED PROVIDER NOTE - PROGRESS NOTE DETAILS
Fredi, PGY3- workup unremarkable, d/w Génesis at group [place]. Discussed plan to discharge pt as no concerning findings on ER evaluation. Normal spo2 RA. No distress. No signs of infection. Staff mentioned possibly issue with baclofen pump. No signs of spasm or discomfort at this time. DEISI Snow working on transport. Fredi, PGY3- change in dispo: upon pt being discharged again had hypoxic episode. Down to 81% on RA. 6LNC placed. Recovered to low 90s. Reviewed old records with hospitalist staff. Had these issues before and since birth. Seen by neuro, not thought to be Fredi, PGY3- change in dispo: upon pt being discharged again had hypoxic episode. Down to 81% on RA. 6LNC placed. Recovered to low 90s. Reviewed old records with hospitalist staff. Had these issues before and since birth. Seen by neuro, not thought to be seizures. Versed, phenobarb used in the past. Pt also with baclofen pump in place. Given possible spastic component will call nsg to eval as placed by Dr. Melendrez. NSG PA came and saw and said due for repletion of baclofen. Recommended oral to prevent withdrawal.     After midaz in ED, spo2 much improved. 97% on 3LNC. Admitted to Dr. Menjivar for further workup for this issue Fredi, PGY3- change in dispo: upon pt being discharged again had hypoxic episode. Down to 81% on RA. 6LNC placed. Recovered to low 90s. Reviewed old records with hospitalist staff. Had these issues before and since birth. Seen by neuro in the past, not thought to be seizures. Versed, phenobarb used in the past. Pt also with baclofen pump in place. Given possible spastic component will call nsg to eval as placed by Dr. Melendrez. NSG PA came and saw and said due for repletion of baclofen. Recommended oral to prevent withdrawal.     After midaz in ED, spo2 much improved. 97% on 3LNC. Admitted to Dr. Menjivar for further workup for this issue PulseOx drop to 80% on 6L NC. No chest rise noted on exam. Versed 1 mg given with return of spontaneous respirations and return of pulse Ox to 6L NC. Ativan ordered. Neurology consult for status epilepticus. Hospitalist updated. LAUREN. Pt has had 2 witnessed episodes of hypoxia in ED. Pulse Ox improved with IV benzodiazepines in both cases. Pt became more animated and returned to baseline rhythmic movements after benzos. Huddle held with ED physicians and nursing staff to determine if intubation for airway protection and transient hypoxia due to presumed status epilepticus was in the interest of patient safety. AMN Christianson RN leading expressed nursing team is more comfortable with observing patient on pulseOx over intubation as patient's respiratory status has responded well to IV benzos. Pt will be admitted on continuos pulse Ox. LAUREN. Pt has had 2 witnessed episodes of hypoxia in ED. Pulse Ox improved with IV benzodiazepines in both cases. Pt became more animated and returned to baseline rhythmic movements after benzos. Huddle held with ED physicians and nursing staff to determine if intubation for airway protection and transient hypoxia due to presumed status epilepticus was in the interest of patient safety. AMN Christianson RN leading expressed nursing team is more comfortable with observing patient on pulseOx over intubation as patient's respiratory status has responded well to IV benzos. Only 2 transient hypoxic episodes noted during pt's 9 hour ED course. Pt will be admitted on continuos pulse Ox. LAUREN. Pt has had 2 witnessed episodes of hypoxia in ED. Pulse Ox improved with IV benzodiazepines in both cases. Pt became more animated and returned to baseline rhythmic movements after benzos. Huddle held with ED physicians and nursing staff to determine if intubation for airway protection and transient hypoxia due to presumed status epilepticus was in the interest of patient safety. AMN Christianson RN leading expressed nursing team is more comfortable with observing patient on pulseOx over intubation as patient's respiratory status has responded well to IV benzos. Only 2 transient hypoxic episodes noted during pt's 9 hour ED course and Pt returned to baseline mental status (confirmed by group home staff) with treatment. Pt will be admitted on continuos pulse Ox. LAUREN. PulseOx drop to 80% on 6L NC. No chest rise noted on exam. Versed 1 mg given with return of spontaneous respirations and return of pulse Ox to 6L NC. Ativan ordered. Neurology consult for r/o status epilepticus. NeuroSurgery evaluated baclofen pump, pump empty. Hospitalist updated. LAUREN. Pt has had 2 witnessed episodes of hypoxia in ED. Pulse Ox improved with IV benzodiazepines in both cases. Pt became more animated and returned to baseline rhythmic movements after benzos. Huddle held with ED physicians and nursing staff to determine if intubation for airway protection and transient hypoxia due to presumed status epilepticus vs spasticity from baclofen withdrawal was in the interest of patient safety. AMN Meghan CADENA leading expressed nursing team is more comfortable with observing patient on pulseOx over intubation as patient's respiratory status has responded well to IV benzos. Only 2 transient hypoxic episodes noted during pt's 9 hour ED course and Pt returned to baseline mental status (confirmed by group home staff) with treatment. Pt will be admitted on continuos pulse Ox. LAUREN. Resident spoke with patient's father, Garrett Sanchez, regarding advanced directives. Father reports NO INTUBATION for patient, however CPR is OKAY. MOLST for DNI only signed. LAUREN. Resident spoke with patient's father, Garrett Sanchez, regarding advanced directives. Father reports NO INTUBATION for patient, however CPR is OKAY. MOLST for DNI only signed. MICU consulted by inpt team. LAUREN.

## 2020-08-12 NOTE — ED PROVIDER NOTE - CARE PLAN
Principal Discharge DX:	Cough  Secondary Diagnosis:	Cerebral palsy Principal Discharge DX:	Hypoxia  Secondary Diagnosis:	Cerebral palsy  Secondary Diagnosis:	Cough

## 2020-08-12 NOTE — ED PROVIDER NOTE - NSFOLLOWUPINSTRUCTIONS_ED_ALL_ED_FT
The workup in the ER was reassuring.    Please follow up with your doctor.    Please return to ER for new or concerning symptoms.

## 2020-08-12 NOTE — ED PROVIDER NOTE - OBJECTIVE STATEMENT
25 yoM, hx of cerebral palsy, non verbal BIB EMS from group home for concern for changes in behavior (?pain) and reported hypoxia at the home to mid 80s. Was heard to be coughing. No trauma or fall. No vomiting or diarrhea. At baseline state of health prior to this evening. No report of fever at home. Hx of seizures but no current AEDS.     Limited hx due to mental status.

## 2020-08-12 NOTE — ED ADULT NURSE NOTE - OBJECTIVE STATEMENT
Break Coverage:  Receive pt in spot 7  from [facility] , awake and alert, sent for cough and low 02.  NRM in place.  02 100%. Resp unlabored.  Skin intact.  Pt with contracture to B/L UE and B/L LE.  Baclofen noted to R abd area.  Pt NSR on cardiac monitoring.  pt awaiting MD sanchez.  Pt is wheel chair dependent

## 2020-08-12 NOTE — ED PROVIDER NOTE - CLINICAL SUMMARY MEDICAL DECISION MAKING FREE TEXT BOX
25 yoM pmh cerebral palsy, non verbal, non responsive at baseline BIB EMS from group home for concern for changes in behavior (?pain) and reported hypoxia at the home to mid 80s.  Pt with 100% O2 sat on RA, unlabored breathing, heart rrr, abd soft ntnd, contractures but moves all extremities spontaneously.  Aid at bedside reporting patient is currently behaving at baseline.  Will kendra labs, ua, cxr to eval for any infectious source, electrolyte abnormality and reassess.  - Maddie Blood, DO

## 2020-08-12 NOTE — ED ADULT TRIAGE NOTE - CHIEF COMPLAINT QUOTE
pt  coming from group home for hypoxia. as per staff member nurse was doing routine VS and pt was found to be hypoxic on RA, 82%. pt arrives with NRB. + cough. no fever/chills. RAC 20g.   hx of cerebral palsy, scoliosis, legally blind, seizures

## 2020-08-12 NOTE — ED PROVIDER NOTE - PATIENT PORTAL LINK FT
You can access the FollowMyHealth Patient Portal offered by [facility] by registering at the following website: [email or web address]. By joining [employer]’s FollowMyHealth portal, you will also be able to view your health information using other applications (apps) compatible with our system.

## 2020-08-13 DIAGNOSIS — G40.909 EPILEPSY, UNSPECIFIED, NOT INTRACTABLE, WITHOUT STATUS EPILEPTICUS: ICD-10-CM

## 2020-08-13 DIAGNOSIS — Z29.9 ENCOUNTER FOR PROPHYLACTIC MEASURES, UNSPECIFIED: ICD-10-CM

## 2020-08-13 DIAGNOSIS — S00.01XA ABRASION OF SCALP, INITIAL ENCOUNTER: ICD-10-CM

## 2020-08-13 DIAGNOSIS — R09.02 HYPOXEMIA: ICD-10-CM

## 2020-08-13 DIAGNOSIS — G82.50 QUADRIPLEGIA, UNSPECIFIED: ICD-10-CM

## 2020-08-13 DIAGNOSIS — G80.9 CEREBRAL PALSY, UNSPECIFIED: ICD-10-CM

## 2020-08-13 DIAGNOSIS — Z02.9 ENCOUNTER FOR ADMINISTRATIVE EXAMINATIONS, UNSPECIFIED: ICD-10-CM

## 2020-08-13 PROBLEM — K59.09 OTHER CONSTIPATION: Chronic | Status: ACTIVE | Noted: 2017-07-25

## 2020-08-13 PROBLEM — R47.01 APHASIA: Chronic | Status: ACTIVE | Noted: 2017-07-25

## 2020-08-13 PROBLEM — Z99.3 DEPENDENCE ON WHEELCHAIR: Chronic | Status: ACTIVE | Noted: 2017-07-25

## 2020-08-13 LAB
ALBUMIN SERPL ELPH-MCNC: 4.4 G/DL — SIGNIFICANT CHANGE UP (ref 3.3–5)
ALP SERPL-CCNC: 93 U/L — SIGNIFICANT CHANGE UP (ref 40–120)
ALT FLD-CCNC: 30 U/L — SIGNIFICANT CHANGE UP (ref 4–41)
ANION GAP SERPL CALC-SCNC: 14 MMO/L — SIGNIFICANT CHANGE UP (ref 7–14)
AST SERPL-CCNC: 29 U/L — SIGNIFICANT CHANGE UP (ref 4–40)
BASE EXCESS BLDV CALC-SCNC: 4.7 MMOL/L — SIGNIFICANT CHANGE UP
BILIRUB SERPL-MCNC: 0.6 MG/DL — SIGNIFICANT CHANGE UP (ref 0.2–1.2)
BLOOD GAS VENOUS - CREATININE: 0.61 MG/DL — SIGNIFICANT CHANGE UP (ref 0.5–1.3)
BLOOD GAS VENOUS - FIO2: 95 — SIGNIFICANT CHANGE UP
BUN SERPL-MCNC: 25 MG/DL — HIGH (ref 7–23)
CALCIUM SERPL-MCNC: 9.3 MG/DL — SIGNIFICANT CHANGE UP (ref 8.4–10.5)
CHLORIDE BLDV-SCNC: 104 MMOL/L — SIGNIFICANT CHANGE UP (ref 96–108)
CHLORIDE SERPL-SCNC: 105 MMOL/L — SIGNIFICANT CHANGE UP (ref 98–107)
CHOLEST SERPL-MCNC: 93 MG/DL — LOW (ref 120–199)
CO2 SERPL-SCNC: 26 MMOL/L — SIGNIFICANT CHANGE UP (ref 22–31)
CREAT SERPL-MCNC: 0.58 MG/DL — SIGNIFICANT CHANGE UP (ref 0.5–1.3)
CULTURE RESULTS: NO GROWTH — SIGNIFICANT CHANGE UP
GAS PNL BLDV: 148 MMOL/L — HIGH (ref 136–146)
GLUCOSE BLDV-MCNC: 99 MG/DL — SIGNIFICANT CHANGE UP (ref 70–99)
GLUCOSE SERPL-MCNC: 102 MG/DL — HIGH (ref 70–99)
HCO3 BLDV-SCNC: 27 MMOL/L — SIGNIFICANT CHANGE UP (ref 20–27)
HCT VFR BLD CALC: 40 % — SIGNIFICANT CHANGE UP (ref 39–50)
HCT VFR BLDV CALC: 42.1 % — SIGNIFICANT CHANGE UP (ref 39–51)
HDLC SERPL-MCNC: 47 MG/DL — SIGNIFICANT CHANGE UP (ref 35–55)
HGB BLD-MCNC: 13.2 G/DL — SIGNIFICANT CHANGE UP (ref 13–17)
HGB BLDV-MCNC: 13.7 G/DL — SIGNIFICANT CHANGE UP (ref 13–17)
LACTATE BLDV-MCNC: 0.9 MMOL/L — SIGNIFICANT CHANGE UP (ref 0.5–2)
LACTATE SERPL-SCNC: 0.9 MMOL/L — SIGNIFICANT CHANGE UP (ref 0.5–2)
LIPID PNL WITH DIRECT LDL SERPL: 38 MG/DL — SIGNIFICANT CHANGE UP
MCHC RBC-ENTMCNC: 29.5 PG — SIGNIFICANT CHANGE UP (ref 27–34)
MCHC RBC-ENTMCNC: 33 % — SIGNIFICANT CHANGE UP (ref 32–36)
MCV RBC AUTO: 89.5 FL — SIGNIFICANT CHANGE UP (ref 80–100)
NRBC # FLD: 0 K/UL — SIGNIFICANT CHANGE UP (ref 0–0)
PCO2 BLDV: 53 MMHG — HIGH (ref 41–51)
PH BLDV: 7.37 PH — SIGNIFICANT CHANGE UP (ref 7.32–7.43)
PLATELET # BLD AUTO: 203 K/UL — SIGNIFICANT CHANGE UP (ref 150–400)
PMV BLD: 12.4 FL — SIGNIFICANT CHANGE UP (ref 7–13)
PO2 BLDV: 47 MMHG — HIGH (ref 35–40)
POTASSIUM BLDV-SCNC: 3.8 MMOL/L — SIGNIFICANT CHANGE UP (ref 3.4–4.5)
POTASSIUM SERPL-MCNC: 4 MMOL/L — SIGNIFICANT CHANGE UP (ref 3.5–5.3)
POTASSIUM SERPL-SCNC: 4 MMOL/L — SIGNIFICANT CHANGE UP (ref 3.5–5.3)
PROLACTIN SERPL-MCNC: 13.2 NG/ML — SIGNIFICANT CHANGE UP (ref 4.1–18.4)
PROT SERPL-MCNC: 6.8 G/DL — SIGNIFICANT CHANGE UP (ref 6–8.3)
RBC # BLD: 4.47 M/UL — SIGNIFICANT CHANGE UP (ref 4.2–5.8)
RBC # FLD: 13.5 % — SIGNIFICANT CHANGE UP (ref 10.3–14.5)
SAO2 % BLDV: 79.6 % — SIGNIFICANT CHANGE UP (ref 60–85)
SARS-COV-2 IGG SERPL QL IA: NEGATIVE — SIGNIFICANT CHANGE UP
SARS-COV-2 IGM SERPL IA-ACNC: 0.85 INDEX — SIGNIFICANT CHANGE UP
SARS-COV-2 RNA SPEC QL NAA+PROBE: SIGNIFICANT CHANGE UP
SODIUM SERPL-SCNC: 145 MMOL/L — SIGNIFICANT CHANGE UP (ref 135–145)
SPECIMEN SOURCE: SIGNIFICANT CHANGE UP
TRIGL SERPL-MCNC: 69 MG/DL — SIGNIFICANT CHANGE UP (ref 10–149)
TSH SERPL-MCNC: 1.64 UIU/ML — SIGNIFICANT CHANGE UP (ref 0.27–4.2)
WBC # BLD: 7.29 K/UL — SIGNIFICANT CHANGE UP (ref 3.8–10.5)
WBC # FLD AUTO: 7.29 K/UL — SIGNIFICANT CHANGE UP (ref 3.8–10.5)

## 2020-08-13 PROCEDURE — 70450 CT HEAD/BRAIN W/O DYE: CPT | Mod: 26

## 2020-08-13 PROCEDURE — 99223 1ST HOSP IP/OBS HIGH 75: CPT

## 2020-08-13 PROCEDURE — 99222 1ST HOSP IP/OBS MODERATE 55: CPT | Mod: GC

## 2020-08-13 PROCEDURE — 74018 RADEX ABDOMEN 1 VIEW: CPT | Mod: 26

## 2020-08-13 PROCEDURE — 99223 1ST HOSP IP/OBS HIGH 75: CPT | Mod: GC

## 2020-08-13 PROCEDURE — 12345: CPT | Mod: NC

## 2020-08-13 PROCEDURE — 99233 SBSQ HOSP IP/OBS HIGH 50: CPT

## 2020-08-13 RX ORDER — LEVETIRACETAM 250 MG/1
200 TABLET, FILM COATED ORAL EVERY 12 HOURS
Refills: 0 | Status: DISCONTINUED | OUTPATIENT
Start: 2020-08-13 | End: 2020-08-16

## 2020-08-13 RX ORDER — ACETAMINOPHEN 500 MG
1000 TABLET ORAL ONCE
Refills: 0 | Status: COMPLETED | OUTPATIENT
Start: 2020-08-13 | End: 2020-08-13

## 2020-08-13 RX ORDER — BACLOFEN 100 %
40000 POWDER (GRAM) MISCELLANEOUS ONCE
Refills: 0 | Status: DISCONTINUED | OUTPATIENT
Start: 2020-08-13 | End: 2020-08-14

## 2020-08-13 RX ORDER — OMEPRAZOLE 10 MG/1
1 CAPSULE, DELAYED RELEASE ORAL
Qty: 0 | Refills: 0 | DISCHARGE

## 2020-08-13 RX ORDER — PHENOBARBITAL 60 MG
150 TABLET ORAL ONCE
Refills: 0 | Status: DISCONTINUED | OUTPATIENT
Start: 2020-08-13 | End: 2020-08-13

## 2020-08-13 RX ORDER — LEVETIRACETAM 250 MG/1
1000 TABLET, FILM COATED ORAL ONCE
Refills: 0 | Status: COMPLETED | OUTPATIENT
Start: 2020-08-13 | End: 2020-08-13

## 2020-08-13 RX ORDER — MIDAZOLAM HYDROCHLORIDE 1 MG/ML
1 INJECTION, SOLUTION INTRAMUSCULAR; INTRAVENOUS ONCE
Refills: 0 | Status: DISCONTINUED | OUTPATIENT
Start: 2020-08-13 | End: 2020-08-13

## 2020-08-13 RX ORDER — MUPIROCIN 20 MG/G
1 OINTMENT TOPICAL THREE TIMES A DAY
Refills: 0 | Status: DISCONTINUED | OUTPATIENT
Start: 2020-08-13 | End: 2020-08-16

## 2020-08-13 RX ORDER — BACLOFEN 100 %
5 POWDER (GRAM) MISCELLANEOUS THREE TIMES A DAY
Refills: 0 | Status: DISCONTINUED | OUTPATIENT
Start: 2020-08-13 | End: 2020-08-13

## 2020-08-13 RX ORDER — BACLOFEN 100 %
20000 POWDER (GRAM) MISCELLANEOUS ONCE
Refills: 0 | Status: DISCONTINUED | OUTPATIENT
Start: 2020-08-13 | End: 2020-08-13

## 2020-08-13 RX ADMIN — MIDAZOLAM HYDROCHLORIDE 1 MILLIGRAM(S): 1 INJECTION, SOLUTION INTRAMUSCULAR; INTRAVENOUS at 04:16

## 2020-08-13 RX ADMIN — LEVETIRACETAM 400 MILLIGRAM(S): 250 TABLET, FILM COATED ORAL at 14:28

## 2020-08-13 RX ADMIN — LEVETIRACETAM 400 MILLIGRAM(S): 250 TABLET, FILM COATED ORAL at 02:33

## 2020-08-13 RX ADMIN — Medication 400 MILLIGRAM(S): at 04:29

## 2020-08-13 RX ADMIN — MIDAZOLAM HYDROCHLORIDE 1 MILLIGRAM(S): 1 INJECTION, SOLUTION INTRAMUSCULAR; INTRAVENOUS at 02:05

## 2020-08-13 RX ADMIN — Medication 650 MILLIGRAM(S): at 04:29

## 2020-08-13 RX ADMIN — MUPIROCIN 1 APPLICATION(S): 20 OINTMENT TOPICAL at 21:26

## 2020-08-13 RX ADMIN — MUPIROCIN 1 APPLICATION(S): 20 OINTMENT TOPICAL at 14:28

## 2020-08-13 RX ADMIN — Medication 1 MILLIGRAM(S): at 04:13

## 2020-08-13 NOTE — PROGRESS NOTE ADULT - ASSESSMENT
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

## 2020-08-13 NOTE — H&P ADULT - ASSESSMENT
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 Pt is being admitted for Hypoxia. 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 Pt is being admitted for Hypoxia.

## 2020-08-13 NOTE — CONSULT NOTE ADULT - SUBJECTIVE AND OBJECTIVE BOX
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 Subsequently started on keppra while inpatient. Family was contacted, father made decision for patient to be DNI but can have compressions with molst in the chart. MICU consulted for possible status.    At time of exam patient is at baseline as per aide from group home. Patient unable to track with verbal and visual stimulation, satting well on NC, protecting airway.        PAST MEDICAL & SURGICAL HISTORY:  Wheelchair dependence  Nonverbal  Chronic constipation  Cortical blindness  Spastic quadriparesis  Cerebral palsy  CP (cerebral palsy), spastic, quadriplegic: Baclofen pump implant 2011  History of back surgery: Scoliosis repair 2011 with Woo rods      FAMILY HISTORY:  No pertinent family history in first degree relatives      SOCIAL HISTORY:  Smoking: __ packs x ___ years  EtOH Use:  Marital Status:  Occupation:  Recent Travel:  Country of Birth:  Advance Directives:    Allergies    No Known Allergies    Intolerances        HOME MEDICATIONS:    REVIEW OF SYSTEMS:  Constitutional:   Eyes:  ENT:  CV:  Resp:  GI:  :  MSK:  Integumentary:  Neurological:  Psychiatric:  Endocrine:  Hematologic/Lymphatic:  Allergic/Immunologic:  [ ] All other systems negative  [x] Unable to assess ROS because _clinical condition_    OBJECTIVE:  ICU Vital Signs Last 24 Hrs  T(C): 36.9 (13 Aug 2020 04:29), Max: 37.7 (12 Aug 2020 19:31)  T(F): 98.5 (13 Aug 2020 04:29), Max: 99.8 (12 Aug 2020 19:31)  HR: 102 (13 Aug 2020 04:29) (73 - 102)  BP: 147/84 (13 Aug 2020 04:29) (104/72 - 147/84)  BP(mean): --  ABP: --  ABP(mean): --  RR: 18 (13 Aug 2020 04:29) (14 - 20)  SpO2: 95% (13 Aug 2020 04:29) (95% - 100%)         @ 07:01  -   @ 05:39  --------------------------------------------------------  IN: 0 mL / OUT: 300 mL / NET: -300 mL      CAPILLARY BLOOD GLUCOSE          PHYSICAL EXAM:  General: NAD, thin  HEENT:   Lymph Nodes: no tender lymphadenopathy  Neck:   Respiratory: CTAB  Cardiovascular: RRR  Abdomen: soft, non tender non distended  Extremities: deformed extremities  Skin:   Neurological: nonverbal, ao0  Psychiatry:    HOSPITAL MEDICATIONS:  MEDICATIONS  (STANDING):  baclofen 5 milliGRAM(s) Oral three times a day  levETIRAcetam  IVPB 200 milliGRAM(s) IV Intermittent every 12 hours    MEDICATIONS  (PRN):      LABS:                        13.2   7.29  )-----------( 203      ( 13 Aug 2020 04:50 )             40.0         145  |  105  |  25<H>  ----------------------------<  102<H>  4.0   |  26  |  0.58    Ca    9.3      13 Aug 2020 04:50    TPro  6.8  /  Alb  4.4  /  TBili  0.6  /  DBili  x   /  AST  29  /  ALT  30  /  AlkPhos  93  13    PT/INR - ( 12 Aug 2020 20:50 )   PT: 14.3 SEC;   INR: 1.27          PTT - ( 12 Aug 2020 20:50 )  PTT:31.4 SEC  Urinalysis Basic - ( 12 Aug 2020 20:50 )    Color: YELLOW / Appearance: CLEAR / S.040 / pH: 6.0  Gluc: NEGATIVE / Ketone: TRACE  / Bili: NEGATIVE / Urobili: TRACE   Blood: NEGATIVE / Protein: 50 / Nitrite: NEGATIVE   Leuk Esterase: NEGATIVE / RBC: 3-5 / WBC 0-2   Sq Epi: OCC / Non Sq Epi: x / Bacteria: NEGATIVE        Venous Blood Gas:   @ 04:50  7.37/53/47/27/79.6  VBG Lactate: 0.9  Venous Blood Gas:   @ 20:50  7.35/59/32/28/52.2  VBG Lactate: 1.5      MICROBIOLOGY:     RADIOLOGY:  [ ] Reviewed and interpreted by me    EKG:
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 Pt was BIB EMS, Non verbal at baseline , Contractures b/l UE. HHA at the [facility] who states that patient desatted to 80s at group home. Reviewing Pts previous visit history, Pt has history of hypoxia previously.  HHA denies any falls , trauma , fevers. Staff mentioned that they might have issue with baclofen pump today which was interrogated by Neurosurgery and thought to have run out. ED provider who was at bedside noted two episodes where patient began to desaturate and during which patients eyes rolled back  with some UE stiffening.  Patient did not have any tongue lacerations, and is incontinent at baseline. Hypoxia resolved with benzodiazepine administration. Given 1g Keppra load in ED. Father contacted noting he believes these hypoxic episodes are "behavioral" and reiterates patient is a DNI. From outpatient neurology reports, patient has had extensive EEG monitoring which has not shown any epileptiform activity, and seizure history is unclear. Neurology consulted to evaluate for seizure.      PAST MEDICAL & SURGICAL HISTORY:  Wheelchair dependence  Nonverbal  Chronic constipation  Cortical blindness  Spastic quadriparesis  Cerebral palsy  CP (cerebral palsy), spastic, quadriplegic: Baclofen pump implant 2011  History of back surgery: Scoliosis repair 2011 with Woo rods    FAMILY HISTORY:  No pertinent family history in first degree relatives    SOCIAL HISTORY:   T/E/D:   Occupation:   Lives with:     MEDICATIONS (HOME):  Home Medications:  acetaminophen 160 mg/5 mL oral liquid: 20 milliliter(s) orally every 6 hours, As Needed (13 Aug 2020 03:43)  albuterol 2.5 mg/3 mL (0.083%) inhalation solution: 3 milliliter(s) inhaled every 6 hours, As Needed (13 Aug 2020 03:43)  Biotene Mouthwash oral solution:  orally 2 times a day, As Needed (13 Aug 2020 03:43)  bisacodyl 10 mg rectal suppository: 1 suppository(ies) rectal once a day, As Needed if no BM in 72 hours (13 Aug 2020 03:43)  calcium carbonate 1250 mg/5 mL (100 mg/mL elemental calcium) oral suspension: 5 milliliter(s) orally once a day in am (13 Aug 2020 03:43)  clindamycin 1% topical lotion: Apply topically to affected area 2 times a day to face (13 Aug 2020 03:43)  ergocalciferol 8000 intl units/mL oral solution: 2 droppersful  orally 2 times a day (13 Aug 2020 03:43)  Eucerin topical lotion: Apply topically to affected area 2 times a day (13 Aug 2020 03:43)  Fleet Enema: 1  enema given rectally on  and  and PRN (13 Aug 2020 03:43)  ibuprofen 100 mg/5 mL oral suspension: 10 milliliter(s) orally every 6 hours, As Needed. Last dose 2017 (13 Aug 2020 03:43)  ketoconazole 2% topical shampoo: Apply topically to affected area 3 times a week in pm (13 Aug 2020 03:43)  lactulose 10 g/15 mL oral syrup: 30 milliliter(s) orally 2 times a day (13 Aug 2020 03:43)  levETIRAcetam 100 mg/mL oral solution: 2 milliliter(s) orally 2 times a day (13 Aug 2020 03:43)  metoclopramide 5 mg oral tablet: 1 tab(s) orally 3 times a day, As Needed (13 Aug 2020 03:43)  midazolam 5 mg/inh nasal spray:  (13 Aug 2020 03:43)  multivitamin: 15 milliliter(s) orally once a day in am last dose 17 (13 Aug 2020 03:43)  omeprazole 20 mg oral delayed release tablet: 1 tab(s) orally once a day in am (13 Aug 2020 03:43)  polyethylene glycol 3350 oral powder for reconstitution: 17 gram(s) orally once a day in pm (13 Aug 2020 03:43)  proteinex- 18: 30 milliliter(s) orally once a day in am (13 Aug 2020 03:43)  pseudoephedrine 30 mg oral tablet: 2 tab(s) orally once a day, As Needed (13 Aug 2020 03:43)  senna 8.6 mg oral tablet: 2 tab(s) orally once a day (at bedtime) (13 Aug 2020 03:43)  Vitamin A &amp; D topical ointment: Apply topically to affected area prn with diaper changes (13 Aug 2020 03:43)    MEDICATIONS  (STANDING):  levETIRAcetam  IVPB 200 milliGRAM(s) IV Intermittent every 12 hours    MEDICATIONS  (PRN):  LORazepam   Injectable 2 milliGRAM(s) IV Push every 6 hours PRN seizures    ALLERGIES/INTOLERANCES:  Allergies  No Known Allergies    Intolerances    VITALS & EXAMINATION:  Vital Signs Last 24 Hrs  T(C): 36.9 (13 Aug 2020 04:29), Max: 37.7 (12 Aug 2020 19:31)  T(F): 98.5 (13 Aug 2020 04:29), Max: 99.8 (12 Aug 2020 19:31)  HR: 102 (13 Aug 2020 04:29) (73 - 102)  BP: 147/84 (13 Aug 2020 04:29) (104/72 - 147/84)  BP(mean): --  RR: 18 (13 Aug 2020 04:29) (14 - 20)  SpO2: 95% (13 Aug 2020 04:29) (95% - 100%)    General:  Constitutional: Male, appears stated age, in no apparent distress including pain, lethargic, possibly post-ictal appearing  Head: Normocephalic & atraumatic.    Extremities: No cyanosis, clubbing, or edema. Contracted in UE BL  Skin: No rashes, bruising, or discoloration.        Neurological (>12):  MS: Eyes closed, does not arouse to verbal or noxious stimuli, does not offer verbal output to assess orientation    Language: Speech is clear, fluent with good repetition & comprehension (able to name objects___)    CNs: PERRLA (R = 3mm, L = 3mm). No BTT. Corneal Reflex present BL,  well developed masseter muscles b/l. No facial asymmetry b/l, full eye closure strength b/l. Gag reflex deferred.     Motor: Diffusely diminished muscle bulk & hypertonic throughout, spastic quadraplegic.	     Sensation: No grimace to noxious stimuli in any extremity        LABORATORY:  CBC                       13.2   7.29  )-----------( 203      ( 13 Aug 2020 04:50 )             40.0     Chem 08-13    145  |  105  |  25<H>  ----------------------------<  102<H>  4.0   |  26  |  0.58    Ca    9.3      13 Aug 2020 04:50    TPro  6.8  /  Alb  4.4  /  TBili  0.6  /  DBili  x   /  AST  29  /  ALT  30  /  AlkPhos  93  08-13    LFTs LIVER FUNCTIONS - ( 13 Aug 2020 04:50 )  Alb: 4.4 g/dL / Pro: 6.8 g/dL / ALK PHOS: 93 u/L / ALT: 30 u/L / AST: 29 u/L / GGT: x           Coagulopathy PT/INR - ( 12 Aug 2020 20:50 )   PT: 14.3 SEC;   INR: 1.27          PTT - ( 12 Aug 2020 20:50 )  PTT:31.4 SEC  Lipid Panel  Chol 93<L> LDL 38 HDL 47 Trig 69  A1c   Cardiac enzymes     U/A Urinalysis Basic - ( 12 Aug 2020 20:50 )    Color: YELLOW / Appearance: CLEAR / S.040 / pH: 6.0  Gluc: NEGATIVE / Ketone: TRACE  / Bili: NEGATIVE / Urobili: TRACE   Blood: NEGATIVE / Protein: 50 / Nitrite: NEGATIVE   Leuk Esterase: NEGATIVE / RBC: 3-5 / WBC 0-2   Sq Epi: OCC / Non Sq Epi: x / Bacteria: NEGATIVE        Radiology (XR, CT, MR, U/S, TTE/MARIA ANTONIA):
HPI: 25M pmhx CP, non verbal, seizures, baclofen pump last revised by DR. Keller in 2017 lives at group home. BIB EMS from [facility] for an episode of hypoxia desat to 80's. Per ED patient was taken off of NRB sating well and was ready for DC back to group home when he developed another episode of desat to 80s and some increase in spasticity in b/l UE. Patient placed on 4L NC and neurosurgery called for pump interrogation.      PAST MEDICAL & SURGICAL HISTORY:  Wheelchair dependence  Nonverbal  Chronic constipation  Cortical blindness  Spastic quadriparesis  Cerebral palsy  CP (cerebral palsy), spastic, quadriplegic: Baclofen pump implant 2011  History of back surgery: Scoliosis repair 2011 with Woo rods    FAMILY HISTORY:  No pertinent family history in first degree relatives    Home Medications:  acetaminophen 160 mg/5 mL oral liquid: 20 milliliter(s) orally every 6 hours, As Needed (13 Aug 2020 03:43)  albuterol 2.5 mg/3 mL (0.083%) inhalation solution: 3 milliliter(s) inhaled every 6 hours, As Needed (13 Aug 2020 03:43)  Biotene Mouthwash oral solution:  orally 2 times a day, As Needed (13 Aug 2020 03:43)  bisacodyl 10 mg rectal suppository: 1 suppository(ies) rectal once a day, As Needed if no BM in 72 hours (13 Aug 2020 03:43)  calcium carbonate 1250 mg/5 mL (100 mg/mL elemental calcium) oral suspension: 5 milliliter(s) orally once a day in am (13 Aug 2020 03:43)  clindamycin 1% topical lotion: Apply topically to affected area 2 times a day to face (13 Aug 2020 03:43)  ergocalciferol 8000 intl units/mL oral solution: 2 droppersful  orally 2 times a day (13 Aug 2020 03:43)  Eucerin topical lotion: Apply topically to affected area 2 times a day (13 Aug 2020 03:43)  Fleet Enema: 1  enema given rectally on  and  and PRN (13 Aug 2020 03:43)  ibuprofen 100 mg/5 mL oral suspension: 10 milliliter(s) orally every 6 hours, As Needed. Last dose 2017 (13 Aug 2020 03:43)  ketoconazole 2% topical shampoo: Apply topically to affected area 3 times a week in pm (13 Aug 2020 03:43)  lactulose 10 g/15 mL oral syrup: 30 milliliter(s) orally 2 times a day (13 Aug 2020 03:43)  levETIRAcetam 100 mg/mL oral solution: 2 milliliter(s) orally 2 times a day (13 Aug 2020 03:43)  metoclopramide 5 mg oral tablet: 1 tab(s) orally 3 times a day, As Needed (13 Aug 2020 03:43)  midazolam 5 mg/inh nasal spray:  (13 Aug 2020 03:43)  multivitamin: 15 milliliter(s) orally once a day in am last dose 17 (13 Aug 2020 03:43)  omeprazole 20 mg oral delayed release tablet: 1 tab(s) orally once a day in am (13 Aug 2020 03:43)  polyethylene glycol 3350 oral powder for reconstitution: 17 gram(s) orally once a day in pm (13 Aug 2020 03:43)  proteinex- 18: 30 milliliter(s) orally once a day in am (13 Aug 2020 03:43)  pseudoephedrine 30 mg oral tablet: 2 tab(s) orally once a day, As Needed (13 Aug 2020 03:43)  senna 8.6 mg oral tablet: 2 tab(s) orally once a day (at bedtime) (13 Aug 2020 03:43)  Vitamin A &amp; D topical ointment: Apply topically to affected area prn with diaper changes (13 Aug 2020 03:43)      MEDICATIONS  (STANDING):  baclofen 5 milliGRAM(s) Oral three times a day    MEDICATIONS  (PRN):    Vital Signs Last 24 Hrs  T(C): 36.9 (13 Aug 2020 04:29), Max: 37.7 (12 Aug 2020 19:31)  T(F): 98.5 (13 Aug 2020 04:29), Max: 99.8 (12 Aug 2020 19:31)  HR: 102 (13 Aug 2020 04:29) (73 - 102)  BP: 147/84 (13 Aug 2020 04:29) (104/72 - 147/84)  BP(mean): --  RR: 18 (13 Aug 2020 04:29) (14 - 20)  SpO2: 95% (13 Aug 2020 04:29) (95% - 100%)    PHYSICAL EXAM:  Awake, non verbal  PERRL  spastic quadraparesis  pump interrogated reservoir volum 0ml and refill date 20.    LABS:  Urinalysis Basic - ( 12 Aug 2020 20:50 )    Color: YELLOW / Appearance: CLEAR / S.040 / pH: 6.0  Gluc: NEGATIVE / Ketone: TRACE  / Bili: NEGATIVE / Urobili: TRACE   Blood: NEGATIVE / Protein: 50 / Nitrite: NEGATIVE   Leuk Esterase: NEGATIVE / RBC: 3-5 / WBC 0-2   Sq Epi: OCC / Non Sq Epi: x / Bacteria: NEGATIVE      PT/INR - ( 12 Aug 2020 20:50 )   PT: 14.3 SEC;   INR: 1.27          PTT - ( 12 Aug 2020 20:50 )  PTT:31.4 SEC                        13.5   10.30 )-----------( 228      ( 12 Aug 2020 20:50 )             43.1     08-12    147<H>  |  108<H>  |  26<H>  ----------------------------<  110<H>  4.9   |  26  |  0.69    Ca    10.0      12 Aug 2020 20:50

## 2020-08-13 NOTE — CONSULT NOTE ADULT - PROBLEM SELECTOR RECOMMENDATION 9
- abdominal xray done will f/u read  - will need refill on baclofen pump in am.  - continuous pulse ox  - start oral baclofen    will d/w attending

## 2020-08-13 NOTE — PROGRESS NOTE ADULT - SUBJECTIVE AND OBJECTIVE BOX
24 y/o Male with PMHx of cerebral palsy, seizures, spastic quadriplegia ( Baclofen pump was implant 5/2011) who presented with hypoxia. The Baclofen pump was interrogated and was found to be due for refill today 8/13/20.  Procedure note:  Reservoir was accessed using 22gauge spinal needle 0.5cc of baclofen was withdrawn from pump reservoir. 40,000mcg(20mL) of 2,000mcg/mL Baclofen concentration was injected using a filter. Pump settings were then updated. Patient tolerated the procedure well.    Pump Refilled: 8/13/20  Drug Concentration: 2,000mcg/ML  Baclofen Dose/day: 549.4 mcg/day  Reservoir volume: 20mL  Low Reservoir Alarm Date: 10/17/2020    Pt should have Refill scheduled by 10/10/2020 to avoid Baclofen Withdrawal.  mikaela Bach at [facility] was updated regarding pump refill status.  Case d/w Dr. Kohli

## 2020-08-13 NOTE — PROVIDER CONTACT NOTE (OTHER) - SITUATION
I monitored patients heart rate on the vital signs machine and patient's heart rate has been ranging from 100-120 beats for the past hour/

## 2020-08-13 NOTE — PROGRESS NOTE ADULT - SUBJECTIVE AND OBJECTIVE BOX
[facility] Division of Hospital Medicine  Adriano Prather MD  Pager (M-F, 8A-5P): [phone]  Other Times:  [phone]    Patient is a 25y old  Male who presents with a chief complaint of Hypoxia (13 Aug 2020 11:29)    SUBJECTIVE / OVERNIGHT EVENTS:  Patient unable to provide history due to non-verbal status and cerebral palsy. No episode of hypoxia since coming to [facility].  No overnight issues with F/C, vomiting, SOB, Cough, diarrhea.    MEDICATIONS  (STANDING):  baclofen PF IntraThecal 60473 MICROGram(s) IntraThecal once  levETIRAcetam  IVPB 200 milliGRAM(s) IV Intermittent every 12 hours  mupirocin 2% Ointment 1 Application(s) Topical three times a day    MEDICATIONS  (PRN):  LORazepam   Injectable 2 milliGRAM(s) IV Push every 6 hours PRN seizures      Vital Signs Last 24 Hrs  T(C): 37 (13 Aug 2020 12:00), Max: 37.7 (12 Aug 2020 19:31)  T(F): 98.6 (13 Aug 2020 12:00), Max: 99.8 (12 Aug 2020 19:31)  HR: 96 (13 Aug 2020 12:00) (73 - 102)  BP: 109/62 (13 Aug 2020 12:00) (104/72 - 147/84)  BP(mean): --  RR: 18 (13 Aug 2020 12:00) (14 - 20)  SpO2: 97% (13 Aug 2020 12:00) (95% - 100%)  CAPILLARY BLOOD GLUCOSE        I&O's Summary    12 Aug 2020 07:01  -  13 Aug 2020 07:00  --------------------------------------------------------  IN: 0 mL / OUT: 300 mL / NET: -300 mL        PHYSICAL EXAM:  GENERAL: Cachexic  HEAD:  Scalp laceration present,, Normocephalic  EYES: EOMI, PERRLA, conjunctiva and sclera clear  NECK: Supple, No JVD  CHEST/LUNG: Clear to auscultation bilaterally; No wheeze  HEART: Regular rate and rhythm; No murmurs, rubs, or gallops  ABDOMEN: Soft, Nontender, Nondistended; Bowel sounds present  EXTREMITIES:  2+ Peripheral Pulses, No clubbing, cyanosis, or edema. Contracted  PSYCH: Calm  NEUROLOGY: Non-verbal, contracted.  SKIN: No rashes or lesions    LABS:                        13.2   7.29  )-----------( 203      ( 13 Aug 2020 04:50 )             40.0     08-13    145  |  105  |  25<H>  ----------------------------<  102<H>  4.0   |  26  |  0.58    Ca    9.3      13 Aug 2020 04:50    TPro  6.8  /  Alb  4.4  /  TBili  0.6  /  DBili  x   /  AST  29  /  ALT  30  /  AlkPhos  93  08-13    PT/INR - ( 12 Aug 2020 20:50 )   PT: 14.3 SEC;   INR: 1.27          PTT - ( 12 Aug 2020 20:50 )  PTT:31.4 SEC      Urinalysis Basic - ( 12 Aug 2020 20:50 )    Color: YELLOW / Appearance: CLEAR / S.040 / pH: 6.0  Gluc: NEGATIVE / Ketone: TRACE  / Bili: NEGATIVE / Urobili: TRACE   Blood: NEGATIVE / Protein: 50 / Nitrite: NEGATIVE   Leuk Esterase: NEGATIVE / RBC: 3-5 / WBC 0-2   Sq Epi: OCC / Non Sq Epi: x / Bacteria: NEGATIVE        RADIOLOGY & ADDITIONAL TESTS:    Imaging Personally Reviewed:    Care Discussed with Consultants/Other Providers:

## 2020-08-13 NOTE — CONSULT NOTE ADULT - ATTENDING COMMENTS
Patient seen and examined with neurology team and above note reviewed and I agree with assessment and plan as outlined. Patient with hx of cerebral palsy, seizures and muscle contractures presenting with hypoxia and what was suspicious for a seizure.   He was also found to have his baclofen pump empty.  Exam is very limited as patient is nonverbal however he does grimace and withdraw to noxious stimuli in his extremities  Reflexes are 3+ throughout and plantars downgoing    Imaging reviewed     Plan: Patient with seizure disorder here for likely breakthru seizure and also found to be out of baclofen via pump  Appreciate neurosurgery follow up and they refilled his baclofen pump  He was given an extra load of keppra in ER and will continue with his home dose of keppra   Continue PT and OT and supportive care and seizure precautions.  All questions answered with aide at bedside.

## 2020-08-13 NOTE — CONSULT NOTE ADULT - ASSESSMENT
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 now on keppra, DNI. MICU consulted for possible status.    #recommendations  currently not a MICU candidate: protecting airway, not in status, maintaining adequate saturations, BP stable  should go to continuous pulse ox for closer monitoring  appreciate NSG and Neurology recs  would continue keppra load and obtain level in order to reload patient especially if patient was taking medication as reflected on outpatient records  if patient becomes hypoxic with similar tremors would give ativan/versed  would f/u EKG  patient is DNI as per natty  reconsult as necessary  plan discussed with primary ED and IM team    Srinivas Varela PGY2
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 Pt was BIB EMS, Non verbal at baseline , Contractures b/l UE. HHA at the [facility] who states that patient desatted to 80s at group home. Reviewing Pts previous visit history, Pt has history of hypoxia previously.  HHA denies any falls , trauma , fevers. Staff mentioned that they might have issue with baclofen pump today which was interrogated by Neurosurgery and thought to have run out. ED provider who was at bedside noted two episodes where patient began to desaturate and during which patients eyes rolled back accompanied with some increased UE stiffening. Patient did not have any tongue lacerations, and is incontinent at baseline. Hypoxia resolved with benzodiazepine administration. Given 1g Keppra load in ED. Father contacted noting he believes these hypoxic episodes are "behavioral" and reiterates patient is a DNI. From outpatient neurology reports, patient has had extensive EEG monitoring which has not shown any epileptiform activity, and seizure history is unclear. Neurology consulted to evaluate for seizure. Exam reveals a patient who appears lethargic and does not arouse to verbal or noxious stimuli, pupils 3mm and reactive, corneals present, quadraplegic and spastic. Seizure history in patient is again unclear, given DNI status risk of oversedating patient is a genuine concern. Further investigations are required to ascertain nature of this event.      Impression: Hypoxia 2/2 to Oropharyngeal spasticity vs Seizure      Recommendations:      F/U Head CT noncontrast  Obtain Video EEG ASAP  S/P 1g Keppra load, can continue home 200mg solution bid  Refill Baclofen pump  Continue to investigate and treat infectious/toxic/metabolic insults  Seizure precautions
25y M with h/o CP, seizures, spastic quadraparesis with baclofen pump refill date 8/13/20 upon interrogation.

## 2020-08-13 NOTE — PROVIDER CONTACT NOTE (OTHER) - SITUATION
I monitored patients heart rate on the vital signs machine and patient's heart rate has been ranging from 100-120 beats for the past hour.

## 2020-08-13 NOTE — ED ADULT NURSE REASSESSMENT NOTE - NS ED NURSE REASSESS COMMENT FT1
Pt MICU rejected after episode of desating. SURINDER Wallace called and updated. Pt O2 sat 98% on 5L NC. MD Rai spoke with pt parent; pt DNI but not DNR; MOLST form in the chart. Pt brought to CT scan.

## 2020-08-13 NOTE — H&P ADULT - PROBLEM SELECTOR PLAN 4
1.  Name of PCP:   2.  PCP Contacted on Admission: [ ] Y    [x] N - admitted at night    3.  PCP contacted at Discharge: [ ] Y    [ ] N    [ ] N/A  4.  Post-Discharge Appointment Date and Location:  5.  Summary of Handoff given to PCP: DVT ppx: Vicky  Diet - NPO for now, at home on pureed food with honey thick liquids

## 2020-08-13 NOTE — H&P ADULT - NSHPSOCIALHISTORY_GEN_ALL_CORE
Tobacco Usage:  (x ) never smoked   ( ) former smoker  ( ) current smoker; Packs per day:   Alcohol Usage: (x ) none  ( ) occasional ( ) 2-3 times a week ( ) daily; Last drink:   Recreational drugs (x ) None  Lives with group home

## 2020-08-13 NOTE — H&P ADULT - NSICDXPASTSURGICALHX_GEN_ALL_CORE_FT
PAST SURGICAL HISTORY:  CP (cerebral palsy), spastic, quadriplegic Baclofen pump implant 5/2011    History of back surgery Scoliosis repair 8/2011 with Woo rods

## 2020-08-13 NOTE — H&P ADULT - HISTORY OF PRESENT ILLNESS
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 Pt was BIB EMS, Non verbal at baseline , Contractures b/l UE. HHA at the [facility] who states that patient desatted to 80s at group home. Reviewing Pts previous visit history, Pt has history of hypoxia previously.  HHA denies any falls , trauma , fevers. Staff mentioned that they might have issue with baclofen pump today per ED documentation. 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 Pt was BIB EMS, Non verbal at baseline , Contractures b/l UE. HHA at the [facility] who states that patient desatted to 80s at group home. Reviewing Pts previous visit history, Pt has history of hypoxia previously.  HHA denies any falls , trauma , fevers. Staff mentioned that they might have issue with baclofen pump today per ED documentation. 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 Pt was BIB EMS, Non verbal at baseline , Contractures b/l UE. HHA at the bedside who states that patient desatted to 80s at group home. Reviewing Pts previous visit history, Pt has history of hypoxia previously.  HHA denies any falls , trauma , fevers. Staff mentioned that they might have issue with baclofen pump today per ED documentation. Spoke with patient's father who said that he believes the patient has had episodes of hypoxia with his seizures in the past but canot be completely sure. Otherwise denies any other known complaints. Of note, patient's documentation from his group home states that he has an abrasion on his scalp, as per HHA it occurred over the weekend and believes it likely due to him hitting his head on a table or some other equipment, said that the abrasion has been improving over time.     On arrival to the ED, his vitals were T 99.1, P 97, /56, RR 20, O2 sat 100% NRB. His lab work and imaging did not show any significant abnormalities and he was initially being discharged back to his group home when he had another hypoxic episode with O2 sats to low 80s with possible spastic vs seizure component. Was placed on supplemental O2 with some improvement in his O2 and was given midazolam with resolution of event and improvement in O2 sat back to high 90s. About an hour later patient had another similar hypoxic episode and improved with ativan. At that point there was a concern for possible status epilepticus for patient but given significant improvements with BZD therapy the decision was made  to hold off on intubation. Neurosurgery was consulted who stated that the patient's baclofen pump was empty (refill date 8/13/20) and MICU was consulted for eval for airway monitoring and determined the patient to not need MICU level of care at this time. He was then admitted to medicine on continuous pulse ox. 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 from Group Home for hypoxia. Pt was BIB EMS, Non verbal at baseline , Contractures b/l UE. HHA at the bedside who states that patient desatted to 80s at group home. Reviewing Pts previous visit history, Pt has history of hypoxia previously.  HHA denies any falls , trauma , fevers. Staff mentioned that they might have issue with baclofen pump today per ED documentation. Spoke with patient's father who said that he believes the patient has had episodes of hypoxia with his seizures in the past but canot be completely sure. Otherwise denies any other known complaints. Of note, patient's documentation from his group home states that he has an abrasion on his scalp, as per HHA it occurred over the weekend and believes it likely due to him hitting his head on a table or some other equipment, said that the abrasion has been improving over time.     On arrival to the ED, his vitals were T 99.1, P 97, /56, RR 20, O2 sat 100% NRB. His lab work and imaging did not show any significant abnormalities and he was initially being discharged back to his group home when he had another hypoxic episode with O2 sats to low 80s with possible spastic vs seizure component. Was placed on supplemental O2 with some improvement in his O2 and was given midazolam with resolution of event and improvement in O2 sat back to high 90s. About an hour later patient had another similar hypoxic episode and improved with ativan. At that point there was a concern for possible status epilepticus for patient but given significant improvements with BZD therapy the decision was made  to hold off on intubation. Neurosurgery was consulted who stated that the patient's baclofen pump was empty (refill date 8/13/20) and MICU was consulted for eval for airway monitoring and determined the patient to not need MICU level of care at this time. He was then admitted to medicine on continuous pulse ox. Father, who is also patient's guardian, made patient DNI but said that he wants compressions, MOLST filled out as such. Overall, in the ED he received tylenol 650mg PO x1, ofirmev 1000mg IVPB x1, ativan 1mg IVP x1, midazolam 1mg IVP x1.

## 2020-08-13 NOTE — H&P ADULT - NSHPLABSRESULTS_GEN_ALL_CORE
LABORATORY VALUES	 	                          13.5   10.30 )-----------( 228      ( 12 Aug 2020 20:50 )             43.1           147<H>  |  108<H>  |  26<H>  ----------------------------<  110<H>  4.9   |  26  |  0.69    Ca    10.0      12 Aug 2020 20:50        Prothrombin Time, Plasma: 14.3 SEC ( @ 20:50)    Blood Gas Venous - Lactate: 1.5 mmol/L ( @ 20:50)    Urinalysis Basic - ( 12 Aug 2020 20:50 )    Color: YELLOW / Appearance: CLEAR / S.040 / pH: 6.0  Gluc: NEGATIVE / Ketone: TRACE  / Bili: NEGATIVE / Urobili: TRACE   Blood: NEGATIVE / Protein: 50 / Nitrite: NEGATIVE   Leuk Esterase: NEGATIVE / RBC: 3-5 / WBC 0-2   Sq Epi: OCC / Non Sq Epi: x / Bacteria: NEGATIVE    Chest Xray:  prelim: Clear lungs

## 2020-08-13 NOTE — H&P ADULT - PROBLEM SELECTOR PLAN 3
DVT ppx: Venodynes - Patient with area of ovoid abrasion on scalp with some honey colored crusting, no warmth, surrounding redness, or pain elicited at this time  - Would order for mupirocin ointment TID for 5 days

## 2020-08-13 NOTE — H&P ADULT - PROBLEM SELECTOR PLAN 2
- Neurosurgery following, Recs. Appreciated  - Abdominal Xray done will f/u read  - will need refill on baclofen pump in am.  - continuous pulse ox  - Start oral baclofen  - CT head ordered , follow up the read - Neurosurgery following, Recs. Appreciated  - Abdominal Xray done will f/u read  - will need refill on baclofen pump in am.  - continuous pulse ox  - Start oral baclofen once not NPO  - CT head ordered , follow up the read - Neurosurgery following, Recs. Appreciated  - Abdominal Xray done will f/u read  - will need refill on baclofen pump in am.  - continuous pulse ox  - Start oral baclofen  - CT head ordered, follow up the read  - c/w home keppra (IV formulation for now as patient sedated)

## 2020-08-13 NOTE — H&P ADULT - NSICDXPASTMEDICALHX_GEN_ALL_CORE_FT
PAST MEDICAL HISTORY:  Cerebral palsy     Chronic constipation     Cortical blindness     Nonverbal     Spastic quadriparesis     Wheelchair dependence

## 2020-08-13 NOTE — CONSULT NOTE ADULT - ATTENDING COMMENTS
24 yo with CP nonverbal, lives in group home, h/o sz d/o as infant, unclear sz hx recently ([facility] records without neuro visit since 2017 at which time he was not on AED) but he is now on Keppra qd with prn midazolam nasal spray for sz activity.  Two witnessed episodes of desaturation accompanied by stiffening of extremeties and arching neck - felt to be sz by ED staff.  Each time he responded to benzo with return to baseline O2 sat.  He was given Keppra 1000mg iv load in ED.  Pt's father wishes that he be DNI.  Baclofen pump needs revision and is off - will receive TID baclofen  Lungs clear, vitals stable with exception of above episodes  O2 sat 100% on 3L NC  RR 16  afeb  Unclear desat episodes but most likely seizure related from hx.  Would admit to med floor with continuous pulse ox. He does not require MICU level of care.  Neuro follow up.  NS f/u for baclofen pump.  Seek further clarification of neuro hx since 2017.  Pt is DNI

## 2020-08-13 NOTE — H&P ADULT - PROBLEM SELECTOR PLAN 1
- Hypoxia likely 2/2 to Seizures vs Muscle spasms   - Pt has been having episodes of hypoxia from early childhood  - Group home documents Spo2 86%  - While in the ER , Pt experienced Hypoxic event x2 w/ UE contractions per ED  - s/p Ativan and Versed   - Currently satting >90% on NC 6 L  - MOLST form completed in the chart, Per Father;  Pt Is DNI but NOT DNR  - Neuro c/s Called, Recs Video EEG , Follow up official Note  - CT head Ordered   - MICU c/s Called , Follow up official note  - Ativan 2mg IVP  PRN   - Aspiration , Seizure and fall precautions - Hypoxia likely 2/2 to Muscle spasms vs seizures  - Pt has been having episodes of hypoxia from early childhood with a negative w/u as per father  - Group home documents Spo2 86%  - While in the ER , Pt experienced Hypoxic event x2 w/ UE contractions per ED  - s/p Ativan and Versed   - Currently satting >90% on NC 6 L  - Neuro c/s Called, Recs Video EEG , Follow up official Note  - CT head Ordered   - MICU c/s Called, does not need MICU level of care currently  - Ativan 2mg IVP  PRN   - Given baclofen pump is due for refill there is a possibility these hypoxic episodes are 2/2 diffuse spasms, neurosurgery recommending to place on PO baclofen to avoid withdrawals -> will place on baclofen 5mg TID with mental status improves (mental status still sedated from the BZDs but improving), would also need to reach out to neurosurg vs rehab medicine to see if the baclofen pump can be refilled during this admission  - Aspiration, Seizure, and fall precautions

## 2020-08-13 NOTE — H&P ADULT - PROBLEM SELECTOR PLAN 5
1.  Name of PCP: Dr. David Morrissey  2.  PCP Contacted on Admission: [ ] Y    [x] N - admitted at night    3.  PCP contacted at Discharge: [ ] Y    [ ] N    [ ] N/A  4.  Post-Discharge Appointment Date and Location:  5.  Summary of Handoff given to PCP:

## 2020-08-13 NOTE — ED ADULT NURSE REASSESSMENT NOTE - NS ED NURSE REASSESS COMMENT FT1
Report received from break coverage RN. Pt baseline mental status.  at bedside. Pt unable to be d/c home d/t drop in O2 sat. Pt on 4L NC sat 95%. Vital signs reassessed as noted. Pt medicated as per MD order. Will continue to monitor.

## 2020-08-13 NOTE — H&P ADULT - ATTENDING COMMENTS
Patient seen and examined on8/13/2020. Case discussed with ACP RADHA Sánchez. This is a 25M with CP and Seizure d/o who presents to the hospital with episodes of hypoxia. Initially concerning for possible seizure like activity with arm and face rigidity but given neurosurgery noting patient's baclofen pump is empty patient is likely having diffuse muscle spasms (maybe also involving the diaphgram/intercostals) which is subsequently causing the hypoxic episodes. For now patient is sedated with BZDs  - Appreciated neurology, neurosurgery, and MICU inputs  - Would c/w IV keppra, prn ativan for now  - Currently mental status sedated, would therefore make patient NPO, holding off on PO meds also at present due to sedated MS, if mental status improves then would initiate PO baclofen until patient's pump gets refilled  - Spoke with patient's father at bedside, he agrees that patient is DNI but would want chest compressions, MOLST form filled out as such and in chart. Patient seen and examined on8/13/2020. Case discussed with ACP RADHA Sánchez. This is a 25M with CP and Seizure d/o who presents to the hospital with episodes of hypoxia. Initially concerning for possible seizure like activity with arm and face rigidity but given neurosurgery noting patient's baclofen pump is empty patient is likely having diffuse muscle spasms (maybe also involving the diaphgram/intercostals) which is subsequently causing the hypoxic episodes. For now patient is sedated with BZDs  - Appreciated neurology, neurosurgery, and MICU inputs  - Would c/w IV keppra, prn ativan for now  - Will check ST Head and EEG  - Currently mental status sedated, would therefore make patient NPO, holding off on PO meds also at present due to sedated MS, if mental status improves then would initiate PO baclofen until patient's pump gets refilled  - Spoke with patient's father at bedside, he agrees that patient is DNI but would want chest compressions, MOLST form filled out as such and in chart.

## 2020-08-13 NOTE — H&P ADULT - NSHPPHYSICALEXAM_GEN_ALL_CORE
T(C): 36.9 (08-13-20 @ 04:29), Max: 37.7 (08-12-20 @ 19:31)  HR: 102 (08-13-20 @ 04:29) (73 - 102)  BP: 147/84 (08-13-20 @ 04:29) (104/72 - 147/84)  RR: 18 (08-13-20 @ 04:29) (14 - 20)  SpO2: 95% (08-13-20 @ 04:29) (95% - 100%)    Constitutional: Non-verbal at baseline  Eyes: EOMI, PERRL  Respiratory: Clear to auscultation bilaterally. No wheezes, rales or rhonchi. Normal respiratory effort  Cardiovascular: RRR, no M/R/G, no edema, 2+ Peripheral Pulses  Gastrointestinal:  + RLQ located pump otherwise soft, nontender, nondistended, +BS, no hernia  Skin: warm, dry, no rash  Neurologic: sensation grossly intact, CN grossly intact, non-focal exam  Musculoskeletal: contractures b/l UE , no clubbing, no cyanosis, no joint swelling T(C): 36.9 (08-13-20 @ 04:29), Max: 37.7 (08-12-20 @ 19:31)  HR: 102 (08-13-20 @ 04:29) (73 - 102)  BP: 147/84 (08-13-20 @ 04:29) (104/72 - 147/84)  RR: 18 (08-13-20 @ 04:29) (14 - 20)  SpO2: 95% (08-13-20 @ 04:29) (95% - 100%)    GENERAL: Thin, cachectic man, contracted upper extremities, currently sedated in setting of BZD doses  ENT: EOMI, conjunctiva and sclera clear, Neck supple, No JVD, moist mucosa  CHEST/LUNG: Clear to auscultation bilaterally; No wheeze, equal breath sounds bilaterally   BACK: No spinal tenderness  HEART: Regular rate and rhythm; No murmurs, rubs, or gallops  ABDOMEN: Soft, Nontender, Nondistended; Bowel sounds present  EXTREMITIES:  No clubbing, cyanosis, or edema  PSYCH: Nl behavior, nl affect  NEUROLOGY: AAOx0 (at baseline), non-focal  SKIN: Normal color, No rashes or lesions

## 2020-08-14 LAB
ANION GAP SERPL CALC-SCNC: 14 MMO/L — SIGNIFICANT CHANGE UP (ref 7–14)
BASOPHILS # BLD AUTO: 0.01 K/UL — SIGNIFICANT CHANGE UP (ref 0–0.2)
BASOPHILS NFR BLD AUTO: 0.1 % — SIGNIFICANT CHANGE UP (ref 0–2)
BUN SERPL-MCNC: 21 MG/DL — SIGNIFICANT CHANGE UP (ref 7–23)
CALCIUM SERPL-MCNC: 9.7 MG/DL — SIGNIFICANT CHANGE UP (ref 8.4–10.5)
CHLORIDE SERPL-SCNC: 104 MMOL/L — SIGNIFICANT CHANGE UP (ref 98–107)
CO2 SERPL-SCNC: 26 MMOL/L — SIGNIFICANT CHANGE UP (ref 22–31)
CREAT SERPL-MCNC: 0.54 MG/DL — SIGNIFICANT CHANGE UP (ref 0.5–1.3)
EOSINOPHIL # BLD AUTO: 0.05 K/UL — SIGNIFICANT CHANGE UP (ref 0–0.5)
EOSINOPHIL NFR BLD AUTO: 0.5 % — SIGNIFICANT CHANGE UP (ref 0–6)
GLUCOSE SERPL-MCNC: 143 MG/DL — HIGH (ref 70–99)
HCT VFR BLD CALC: 42 % — SIGNIFICANT CHANGE UP (ref 39–50)
HGB BLD-MCNC: 13.3 G/DL — SIGNIFICANT CHANGE UP (ref 13–17)
IMM GRANULOCYTES NFR BLD AUTO: 0.3 % — SIGNIFICANT CHANGE UP (ref 0–1.5)
LYMPHOCYTES # BLD AUTO: 2.17 K/UL — SIGNIFICANT CHANGE UP (ref 1–3.3)
LYMPHOCYTES # BLD AUTO: 21.4 % — SIGNIFICANT CHANGE UP (ref 13–44)
MAGNESIUM SERPL-MCNC: 2 MG/DL — SIGNIFICANT CHANGE UP (ref 1.6–2.6)
MCHC RBC-ENTMCNC: 29.2 PG — SIGNIFICANT CHANGE UP (ref 27–34)
MCHC RBC-ENTMCNC: 31.7 % — LOW (ref 32–36)
MCV RBC AUTO: 92.1 FL — SIGNIFICANT CHANGE UP (ref 80–100)
MONOCYTES # BLD AUTO: 0.73 K/UL — SIGNIFICANT CHANGE UP (ref 0–0.9)
MONOCYTES NFR BLD AUTO: 7.2 % — SIGNIFICANT CHANGE UP (ref 2–14)
NEUTROPHILS # BLD AUTO: 7.15 K/UL — SIGNIFICANT CHANGE UP (ref 1.8–7.4)
NEUTROPHILS NFR BLD AUTO: 70.5 % — SIGNIFICANT CHANGE UP (ref 43–77)
NRBC # FLD: 0 K/UL — SIGNIFICANT CHANGE UP (ref 0–0)
PHOSPHATE SERPL-MCNC: 2.8 MG/DL — SIGNIFICANT CHANGE UP (ref 2.5–4.5)
PLATELET # BLD AUTO: 172 K/UL — SIGNIFICANT CHANGE UP (ref 150–400)
PMV BLD: 13.4 FL — HIGH (ref 7–13)
POTASSIUM SERPL-MCNC: 3.9 MMOL/L — SIGNIFICANT CHANGE UP (ref 3.5–5.3)
POTASSIUM SERPL-SCNC: 3.9 MMOL/L — SIGNIFICANT CHANGE UP (ref 3.5–5.3)
RBC # BLD: 4.56 M/UL — SIGNIFICANT CHANGE UP (ref 4.2–5.8)
RBC # FLD: 13.6 % — SIGNIFICANT CHANGE UP (ref 10.3–14.5)
SODIUM SERPL-SCNC: 144 MMOL/L — SIGNIFICANT CHANGE UP (ref 135–145)
WBC # BLD: 10.14 K/UL — SIGNIFICANT CHANGE UP (ref 3.8–10.5)
WBC # FLD AUTO: 10.14 K/UL — SIGNIFICANT CHANGE UP (ref 3.8–10.5)

## 2020-08-14 PROCEDURE — 95816 EEG AWAKE AND DROWSY: CPT | Mod: 26

## 2020-08-14 PROCEDURE — 99233 SBSQ HOSP IP/OBS HIGH 50: CPT

## 2020-08-14 RX ORDER — METOPROLOL TARTRATE 50 MG
25 TABLET ORAL EVERY 12 HOURS
Refills: 0 | Status: DISCONTINUED | OUTPATIENT
Start: 2020-08-14 | End: 2020-08-16

## 2020-08-14 RX ADMIN — MUPIROCIN 1 APPLICATION(S): 20 OINTMENT TOPICAL at 05:22

## 2020-08-14 RX ADMIN — MUPIROCIN 1 APPLICATION(S): 20 OINTMENT TOPICAL at 13:39

## 2020-08-14 RX ADMIN — LEVETIRACETAM 400 MILLIGRAM(S): 250 TABLET, FILM COATED ORAL at 13:39

## 2020-08-14 RX ADMIN — LEVETIRACETAM 400 MILLIGRAM(S): 250 TABLET, FILM COATED ORAL at 02:00

## 2020-08-14 RX ADMIN — Medication 25 MILLIGRAM(S): at 17:52

## 2020-08-14 NOTE — PROGRESS NOTE ADULT - PROBLEM SELECTOR PLAN 1
Continue to monitor pulse ox.  Concern for seizure vs episode of apnea - leaning towards latter.  Continue to titrate down on NC O2 if sat>90%.  Unlikely to be due to spasm.

## 2020-08-14 NOTE — PROVIDER CONTACT NOTE (OTHER) - ACTION/TREATMENT ORDERED:
maintain Pt on 2L nasal canula, continue to monitor on continuous pulse ox, if Pt desaturates on 2L via nasal canula contact PA

## 2020-08-14 NOTE — PROGRESS NOTE ADULT - ATTENDING COMMENTS
Discussed with Father: Garrett Sanchez [phone] on 8/13, 8/14.  Anticipate discharge back to group home tomorrow if EEG is neg and episode of hypoxia is resolved.

## 2020-08-14 NOTE — EEG REPORT - NS EEG TEXT BOX
[facility]   COMPREHENSIVE EPILEPSY CENTER   REPORT OF ROUTINE VIDEO EEG     [facility]: [address] , [address], [place] [address], Ph#: [phone]  [facility]: [address], [place] [address], Ph#: [phone]      Patient Name: PATI CONTRERAS  Age and : 25y ([birth year])  MRN #: [id number]  Location: [place] [address] [address]  Referring Physician: Adriano Prather    Study Date: 20    _____________________________________________________________  TECHNICAL INFORMATION    Placement and Labeling of Electrodes:  The EEG was performed utilizing 20 channels referential EEG connections (coronal over temporal over parasagittal montage) using all standard 10-20 electrode placements with EKG.  Recording was at a sampling rate of 256 samples per second per channel.  Time synchronized digital video recording was done simultaneously with EEG recording.  A low light infrared camera was used for low light recording.  Ernesto and seizure detection algorithms were utilized.    _____________________________________________________________  HISTORY    Patient is a 25y old  Male who presents with a chief complaint of Hypoxia (14 Aug 2020 10:08)      PERTINENT MEDICATION:  MEDICATIONS  (STANDING):  levETIRAcetam  IVPB 200 milliGRAM(s) IV Intermittent every 12 hours  metoprolol tartrate 25 milliGRAM(s) Oral every 12 hours  mupirocin 2% Ointment 1 Application(s) Topical three times a day    _____________________________________________________________  STUDY INTERPRETATION    Findings: The background was continuous, spontaneously variable and reactive. During wakefulness, the posterior dominant rhythm consisted of symmetric, poorly modulated 7 Hz activity, with amplitude to 15 uV, that attenuated to eye opening.  Low amplitude frontal beta was noted in wakefulness.    Background Slowing:    Background intermittently consisted of theta, delta.     Focal Slowing:   None were present.      Sleep Background:    Drowsiness and stage II sleep transients were not recorded.  Stage II sleep transients were not recorded.    Other Non-Epileptiform Findings:  None were present.      Interictal Epileptiform Activity:   None were present.    Events:  Clinical events: None recorded.  Seizures: None recorded.    Activation Procedures:     Hyperventilation was performed and did not elicit any abnormality.  Photic stimulation was performed and did not elicit any abnormality.     Artifacts:  Intermittent myogenic and movement artifacts were noted.    ECG:  The heart rate on single channel ECG was predominantly between 60-70 BPM.  _____________________________________________________________  EEG SUMMARY/CLASSIFICATION    Normal / Abnormal EEG in the awake state  - Mild to Moderate generalized slowing.  - Diffuse low amplitude EEG     _____________________________________________________________  EEG IMPRESSION/CLINICAL CORRELATE    Abnormal EEG study.  1. Moderate nonspecific diffuse or multifocal cerebral dysfunction.   2. No epileptiform pattern or seizure seen.  3. Diffuse low amplitude EEG can be seen with underlying CNS dysfunction or with thick skull.     ______________________________________________________    Prelim read     Daniel Jain MD  Epilepsy Fellow, [facility] Epilepsy [facility]   COMPREHENSIVE EPILEPSY CENTER   REPORT OF ROUTINE VIDEO EEG     [facility]: [address] , [address], [place] [address], Ph#: [phone]  [facility]: [address], [place] [address], Ph#: [phone]      Patient Name: PATI CONTRERAS  Age and : 25y ([birth year])  MRN #: [id number]  Location: [place] [address] [address]  Referring Physician: Adriano Prather    Study Date: 20    _____________________________________________________________  TECHNICAL INFORMATION    Placement and Labeling of Electrodes:  The EEG was performed utilizing 20 channels referential EEG connections (coronal over temporal over parasagittal montage) using all standard 10-20 electrode placements with EKG.  Recording was at a sampling rate of 256 samples per second per channel.  Time synchronized digital video recording was done simultaneously with EEG recording.  A low light infrared camera was used for low light recording.  Ernesto and seizure detection algorithms were utilized.    _____________________________________________________________  HISTORY    Patient is a 25y old  Male who presents with a chief complaint of Hypoxia (14 Aug 2020 10:08)      PERTINENT MEDICATION:  MEDICATIONS  (STANDING):  levETIRAcetam  IVPB 200 milliGRAM(s) IV Intermittent every 12 hours  metoprolol tartrate 25 milliGRAM(s) Oral every 12 hours  mupirocin 2% Ointment 1 Application(s) Topical three times a day    _____________________________________________________________  STUDY INTERPRETATION    Findings: The background was continuous, spontaneously variable and reactive. During wakefulness, the posterior dominant rhythm consisted of symmetric, poorly modulated 7 Hz activity, with amplitude to 15 uV, that attenuated to eye opening.  Low amplitude frontal beta was noted in wakefulness.    Background Slowing:  Mild intermittent background slowing, theta, delta.     Focal Slowing:   None were present.      Sleep Background:  Drowsiness and stage II sleep transients were not recorded.  Stage II sleep transients were not recorded.    Other Non-Epileptiform Findings:  None were present.      Interictal Epileptiform Activity:   None were present.    Events:  Clinical events: None recorded.  Seizures: None recorded.    Activation Procedures:   Hyperventilation was performed and did not elicit any abnormality.  Photic stimulation was performed and did not elicit any abnormality.     Artifacts:  Intermittent myogenic and movement artifacts were noted.    ECG:  The heart rate on single channel ECG was predominantly between 60-70 BPM.  _____________________________________________________________  EEG SUMMARY/CLASSIFICATION    Normal / Abnormal EEG in the awake state  - Mild generalized slowing.    _____________________________________________________________  EEG IMPRESSION/CLINICAL CORRELATE    Abnormal EEG study.  1. Mild nonspecific diffuse or multifocal cerebral dysfunction.   2. No epileptiform pattern or seizure seen.    ______________________________________________________        Daniel Jain MD  Epilepsy Fellow, [facility] Epilepsy [place]

## 2020-08-14 NOTE — PROGRESS NOTE ADULT - SUBJECTIVE AND OBJECTIVE BOX
[facility] Division of Hospital Medicine  Adriano Prather MD  Pager (M-F, 8A-5P): [phone]  Other Times:  [phone]    Patient is a 25y old  Male who presents with a chief complaint of Hypoxia (13 Aug 2020 12:19)    SUBJECTIVE / OVERNIGHT EVENTS:  Patient was ok with oxygenation until 5:40AM - no events noted by the nursing staff, patient was sleeping, not in any distress.  Patient unable to make his needs known.  No overnight issues with F/C, vomiting, SOB, Cough, diarrhea.    MEDICATIONS  (STANDING):  levETIRAcetam  IVPB 200 milliGRAM(s) IV Intermittent every 12 hours  mupirocin 2% Ointment 1 Application(s) Topical three times a day    MEDICATIONS  (PRN):  LORazepam   Injectable 2 milliGRAM(s) IV Push every 6 hours PRN seizures      Vital Signs Last 24 Hrs  T(C): 36.6 (14 Aug 2020 08:00), Max: 37.1 (13 Aug 2020 16:00)  T(F): 97.9 (14 Aug 2020 08:00), Max: 98.7 (13 Aug 2020 16:00)  HR: 99 (14 Aug 2020 08:00) (83 - 110)  BP: 131/92 (14 Aug 2020 08:00) (109/62 - 131/92)  BP(mean): --  RR: 18 (14 Aug 2020 08:00) (16 - 18)  SpO2: 100% (14 Aug 2020 08:00) (95% - 100%)  CAPILLARY BLOOD GLUCOSE        I&O's Summary      PHYSICAL EXAM:  GENERAL: Cachexic  HEAD:  Scalp laceration present,, Normocephalic  EYES: EOMI, PERRLA, conjunctiva and sclera clear  NECK: Supple, No JVD  CHEST/LUNG: Clear to auscultation bilaterally; No wheeze  HEART: Regular rate and rhythm; No murmurs, rubs, or gallops  ABDOMEN: Soft, Nontender, Nondistended; Bowel sounds present  EXTREMITIES:  2+ Peripheral Pulses, No clubbing, cyanosis, or edema. Contracted  PSYCH: Calm  NEUROLOGY: Non-verbal, contracted.  SKIN: No rashes or lesions    LABS:                        13.3   10.14 )-----------( 172      ( 14 Aug 2020 07:30 )             42.0     08-13    145  |  105  |  25<H>  ----------------------------<  102<H>  4.0   |  26  |  0.58    Ca    9.3      13 Aug 2020 04:50    TPro  6.8  /  Alb  4.4  /  TBili  0.6  /  DBili  x   /  AST  29  /  ALT  30  /  AlkPhos  93  08-13    PT/INR - ( 12 Aug 2020 20:50 )   PT: 14.3 SEC;   INR: 1.27          PTT - ( 12 Aug 2020 20:50 )  PTT:31.4 SEC      Urinalysis Basic - ( 12 Aug 2020 20:50 )    Color: YELLOW / Appearance: CLEAR / S.040 / pH: 6.0  Gluc: NEGATIVE / Ketone: TRACE  / Bili: NEGATIVE / Urobili: TRACE   Blood: NEGATIVE / Protein: 50 / Nitrite: NEGATIVE   Leuk Esterase: NEGATIVE / RBC: 3-5 / WBC 0-2   Sq Epi: OCC / Non Sq Epi: x / Bacteria: NEGATIVE        RADIOLOGY & ADDITIONAL TESTS:    Imaging Personally Reviewed:    Care Discussed with Consultants/Other Providers:

## 2020-08-15 ENCOUNTER — TRANSCRIPTION ENCOUNTER (OUTPATIENT)
Age: 25
End: 2020-08-15

## 2020-08-15 LAB
ANION GAP SERPL CALC-SCNC: 13 MMO/L — SIGNIFICANT CHANGE UP (ref 7–14)
BASOPHILS # BLD AUTO: 0.02 K/UL — SIGNIFICANT CHANGE UP (ref 0–0.2)
BASOPHILS NFR BLD AUTO: 0.1 % — SIGNIFICANT CHANGE UP (ref 0–2)
BUN SERPL-MCNC: 17 MG/DL — SIGNIFICANT CHANGE UP (ref 7–23)
CALCIUM SERPL-MCNC: 9.6 MG/DL — SIGNIFICANT CHANGE UP (ref 8.4–10.5)
CHLORIDE SERPL-SCNC: 103 MMOL/L — SIGNIFICANT CHANGE UP (ref 98–107)
CO2 SERPL-SCNC: 26 MMOL/L — SIGNIFICANT CHANGE UP (ref 22–31)
CREAT SERPL-MCNC: 0.48 MG/DL — LOW (ref 0.5–1.3)
EOSINOPHIL # BLD AUTO: 0.04 K/UL — SIGNIFICANT CHANGE UP (ref 0–0.5)
EOSINOPHIL NFR BLD AUTO: 0.3 % — SIGNIFICANT CHANGE UP (ref 0–6)
GLUCOSE SERPL-MCNC: 94 MG/DL — SIGNIFICANT CHANGE UP (ref 70–99)
HCT VFR BLD CALC: 41.9 % — SIGNIFICANT CHANGE UP (ref 39–50)
HCT VFR BLD CALC: 43.6 % — SIGNIFICANT CHANGE UP (ref 39–50)
HGB BLD-MCNC: 13 G/DL — SIGNIFICANT CHANGE UP (ref 13–17)
HGB BLD-MCNC: 13 G/DL — SIGNIFICANT CHANGE UP (ref 13–17)
IMM GRANULOCYTES NFR BLD AUTO: 0.4 % — SIGNIFICANT CHANGE UP (ref 0–1.5)
LYMPHOCYTES # BLD AUTO: 1.92 K/UL — SIGNIFICANT CHANGE UP (ref 1–3.3)
LYMPHOCYTES # BLD AUTO: 12.9 % — LOW (ref 13–44)
MAGNESIUM SERPL-MCNC: 1.9 MG/DL — SIGNIFICANT CHANGE UP (ref 1.6–2.6)
MCHC RBC-ENTMCNC: 27.8 PG — SIGNIFICANT CHANGE UP (ref 27–34)
MCHC RBC-ENTMCNC: 28.3 PG — SIGNIFICANT CHANGE UP (ref 27–34)
MCHC RBC-ENTMCNC: 29.8 % — LOW (ref 32–36)
MCHC RBC-ENTMCNC: 31 % — LOW (ref 32–36)
MCV RBC AUTO: 91.3 FL — SIGNIFICANT CHANGE UP (ref 80–100)
MCV RBC AUTO: 93.4 FL — SIGNIFICANT CHANGE UP (ref 80–100)
MONOCYTES # BLD AUTO: 1.11 K/UL — HIGH (ref 0–0.9)
MONOCYTES NFR BLD AUTO: 7.4 % — SIGNIFICANT CHANGE UP (ref 2–14)
NEUTROPHILS # BLD AUTO: 11.79 K/UL — HIGH (ref 1.8–7.4)
NEUTROPHILS NFR BLD AUTO: 78.9 % — HIGH (ref 43–77)
NRBC # FLD: 0 K/UL — SIGNIFICANT CHANGE UP (ref 0–0)
NRBC # FLD: 0 K/UL — SIGNIFICANT CHANGE UP (ref 0–0)
PLATELET # BLD AUTO: 228 K/UL — SIGNIFICANT CHANGE UP (ref 150–400)
PLATELET # BLD AUTO: 240 K/UL — SIGNIFICANT CHANGE UP (ref 150–400)
PMV BLD: 11.8 FL — SIGNIFICANT CHANGE UP (ref 7–13)
PMV BLD: 13.8 FL — HIGH (ref 7–13)
POTASSIUM SERPL-MCNC: 4 MMOL/L — SIGNIFICANT CHANGE UP (ref 3.5–5.3)
POTASSIUM SERPL-SCNC: 4 MMOL/L — SIGNIFICANT CHANGE UP (ref 3.5–5.3)
RBC # BLD: 4.59 M/UL — SIGNIFICANT CHANGE UP (ref 4.2–5.8)
RBC # BLD: 4.67 M/UL — SIGNIFICANT CHANGE UP (ref 4.2–5.8)
RBC # FLD: 13.2 % — SIGNIFICANT CHANGE UP (ref 10.3–14.5)
RBC # FLD: 13.2 % — SIGNIFICANT CHANGE UP (ref 10.3–14.5)
SODIUM SERPL-SCNC: 142 MMOL/L — SIGNIFICANT CHANGE UP (ref 135–145)
WBC # BLD: 14.87 K/UL — HIGH (ref 3.8–10.5)
WBC # BLD: 14.94 K/UL — HIGH (ref 3.8–10.5)
WBC # FLD AUTO: 14.87 K/UL — HIGH (ref 3.8–10.5)
WBC # FLD AUTO: 14.94 K/UL — HIGH (ref 3.8–10.5)

## 2020-08-15 PROCEDURE — 71045 X-RAY EXAM CHEST 1 VIEW: CPT | Mod: 26

## 2020-08-15 PROCEDURE — 99232 SBSQ HOSP IP/OBS MODERATE 35: CPT

## 2020-08-15 PROCEDURE — 95720 EEG PHY/QHP EA INCR W/VEEG: CPT

## 2020-08-15 RX ADMIN — MUPIROCIN 1 APPLICATION(S): 20 OINTMENT TOPICAL at 21:45

## 2020-08-15 RX ADMIN — Medication 25 MILLIGRAM(S): at 05:37

## 2020-08-15 RX ADMIN — LEVETIRACETAM 408 MILLIGRAM(S): 250 TABLET, FILM COATED ORAL at 13:39

## 2020-08-15 RX ADMIN — LEVETIRACETAM 400 MILLIGRAM(S): 250 TABLET, FILM COATED ORAL at 02:46

## 2020-08-15 RX ADMIN — Medication 25 MILLIGRAM(S): at 18:01

## 2020-08-15 NOTE — DISCHARGE NOTE PROVIDER - HOSPITAL COURSE
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        Hypoxia:    - Continue to monitor pulse ox.    - Concern for seizure vs episode of apnea - leaning towards latter    - Per neurology, unlikely neurological etiology of hypoxia at this point as there is no EEG correlate    - Continue to titrate down on NC O2 if sat>90%.    - Unlikely to be due to spasm.     - Continue NC PRN, may need outpatient sleep study    - Rpt CXR done and showed __________****        Leukocytosis:    - Afebrile    - CXR showed ____________    - no focal signs of infection    - rpt CBC in 1 week     - monitor off abx for now            Spastic quadriparesis:    - Baclofen pump was refilled - appreciate Neurosurgery consult.    - Responding well.     - Per NeuroSx: Pump Refilled: 8/13/20    Drug Concentration: 2,000mcg/ML    Baclofen Dose/day: 549.4 mcg/day    Reservoir volume: 20mL    Low Reservoir Alarm Date: 10/17/2020        Pt should have Refill scheduled by 10/10/2020 to avoid Baclofen Withdrawal.  mikaela Bach at [facility] was updated regarding pump refill status.            Cerebral palsy:    - Bedbound.         Seizure disorder:    - Video EEG neg for seizures      - Per neurology: EEG report reviewed. So seizures or epileptiform activity noted. Symptoms likely due to issue with baclofen pump. Recommend interval monitoring as it is possible for patient to have baclofen withdrawal if medication runs out. Continue home keppra dose. Recommend outpatient neurology follow up.            On ___ this case was reviewed with  ____, the patient is medically stable and optimized for discharge. All medications were reviewed and prescriptions were sent to mutually agreed upon pharmacy. 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        Hypoxia:    - Likely Secondary to apnea, plan for Discharge to Group Home and O2 Supplement PRN at night if O2 Sat less than 88%    - Per neurology, unlikely neurological etiology of hypoxia at this point as there is no EEG correlate    - Continue to titrate down on NC O2 if sat>90%.    - Unlikely to be due to spasm.     - Continue NC PRN, may need outpatient sleep study    - Rpt CXR done and showed __________****        Leukocytosis:    - Afebrile    - CXR showed ____________    - no focal signs of infection    - rpt CBC in 1 week     - monitor off abx for now            Spastic quadriparesis:    - Baclofen pump was refilled - appreciate Neurosurgery consult.    - Responding well.     - Per NeuroSx: Pump Refilled: 8/13/20    Drug Concentration: 2,000mcg/ML    Baclofen Dose/day: 549.4 mcg/day    Reservoir volume: 20mL    Low Reservoir Alarm Date: 10/17/2020        Pt should have Refill scheduled by 10/10/2020 to avoid Baclofen Withdrawal.  mikaela Bach at [facility] was updated regarding pump refill status.            Cerebral palsy:    - Bedbound.         Seizure disorder:    - Video EEG neg for seizures      - Per neurology: EEG report reviewed. So seizures or epileptiform activity noted. Symptoms likely due to issue with baclofen pump. Recommend interval monitoring as it is possible for patient to have baclofen withdrawal if medication runs out. Continue home keppra dose. Recommend outpatient neurology follow up.            On 8/16 this case was reviewed with Dr. Eduardo , the patient is medically stable and optimized for discharge. All medications were reviewed and prescriptions were sent to mutually agreed upon pharmacy. 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        Hypoxia:    - Likely Secondary to apnea, plan for Discharge to Group Home and O2 Supplement PRN at night if O2 Sat less than 88%    - Per neurology, unlikely neurological etiology of hypoxia at this point as there is no EEG correlate    - Continue to titrate down on NC O2 if sat>90%.    - Unlikely to be due to spasm.     - Continue NC PRN, may need outpatient sleep study    -         Leukocytosis:    - Afebrile    - CXR showed No Infection     - no focal signs of infection    - rpt CBC in 1 week     - monitor off abx for now            Spastic quadriparesis:    - Baclofen pump was refilled - appreciate Neurosurgery consult.    - Responding well.     - Per NeuroSx: Pump Refilled: 8/13/20    Drug Concentration: 2,000mcg/ML    Baclofen Dose/day: 549.4 mcg/day    Reservoir volume: 20mL    Low Reservoir Alarm Date: 10/17/2020        Pt should have Refill scheduled by 10/10/2020 to avoid Baclofen Withdrawal.  mikaela Bach at [facility] was updated regarding pump refill status.            Cerebral palsy:    - Bedbound.         Seizure disorder:    - Video EEG neg for seizures      - Per neurology: EEG report reviewed. So seizures or epileptiform activity noted. Symptoms likely due to issue with baclofen pump. Recommend interval monitoring as it is possible for patient to have baclofen withdrawal if medication runs out. Continue home keppra dose. Recommend outpatient neurology follow up.            On 8/16 this case was reviewed with Dr. Eduardo , the patient is medically stable and optimized for discharge. All medications were reviewed and prescriptions were sent to mutually agreed upon pharmacy.

## 2020-08-15 NOTE — PROVIDER CONTACT NOTE (OTHER) - SITUATION
Patient noted to be drowsy but arouses to voice. Due to cognitive limitations, unable to complete full Neuro check.

## 2020-08-15 NOTE — DISCHARGE NOTE PROVIDER - CARE PROVIDER_API CALL
Filemon Smith  NEUROLOGY  [address] [place] [id number]  Phone: [phone]  Fax: [phone]  Follow Up Time:

## 2020-08-15 NOTE — EEG REPORT - NS EEG TEXT BOX
[facility]   COMPREHENSIVE EPILEPSY CENTER   REPORT OF LONG-TERM VIDEO EEG     [facility]: [address], [address], [place] [address], Ph#: [phone]    Patient Name: PATI CONTRERAS  Age and : 25y ([birth year])  MRN #: [id number]  Location: [address]  Referring Physician: Adriano Prather    Start Time/Date: x:x or 08:00 on   End Time/Date: 08:00 on   Duration:    _____________________________________________________________  STUDY INFORMATION    EEG Recording Technique:  The patient underwent continuous Video-EEG monitoring, using Telemetry System hardware on the XLTek Digital System. EEG and video data were stored on a computer hard drive with important events saved in digital archive files. The material was reviewed by a physician (electroencephalographer / epileptologist) on a daily basis. Ernesto and seizure detection algorithms were utilized and reviewed. An EEG Technician attended to the patient, and was available throughout daytime work hours.  The epilepsy center neurologist was available in person or on call 24-hours per day.    EEG Placement and Labeling of Electrodes:  The EEG was performed utilizing 20 channel referential EEG connections (coronal over temporal over parasagittal montage) using all standard 10-20 electrode placements with EKG, with additional electrodes placed in the inferior temporal region using the modified 10-10 montage electrode placements for elective admissions, or if deemed necessary. Recording was at a sampling rate of 256 samples per second per channel. Time synchronized digital video recording was done simultaneously with EEG recording. A low light infrared camera was used for low light recording.     _____________________________________________________________  HISTORY    Patient is a 25y old  Male who presents with a chief complaint of Hypoxia (14 Aug 2020 10:08)      PERTINENT MEDICATION:  MEDICATIONS  (STANDING):  levETIRAcetam  IVPB 200 milliGRAM(s) IV Intermittent every 12 hours  metoprolol tartrate 25 milliGRAM(s) Oral every 12 hours  mupirocin 2% Ointment 1 Application(s) Topical three times a day    _____________________________________________________________  STUDY INTERPRETATION    Findings: The background was continuous, spontaneously variable and reactive. During wakefulness, the posterior dominant rhythm consisted of symmetric, poorly modulated 7 Hz activity, with amplitude to 15 uV, that attenuated to eye opening.  Low amplitude frontal beta was noted in wakefulness.    Background Slowing:  Mild intermittent background slowing, theta, delta.     Focal Slowing:   None were present.      Sleep Background:  Drowsiness and stage II sleep transients were not recorded.  Stage II sleep transients were recorded and symmetric.     Other Non-Epileptiform Findings:  None were present.      Interictal Epileptiform Activity:   None were present.    Events:  Clinical events: None recorded.  Seizures: None recorded.    Activation Procedures:   Hyperventilation was performed and did not elicit any abnormality.  Photic stimulation was performed and did not elicit any abnormality.     Artifacts:  Intermittent myogenic and movement artifacts were noted.    ECG:  The heart rate on single channel ECG was predominantly between 60-70 BPM.  _____________________________________________________________  EEG SUMMARY/CLASSIFICATION    Normal / Abnormal EEG in the awake state  - Mild generalized slowing.    _____________________________________________________________  EEG IMPRESSION/CLINICAL CORRELATE    Abnormal EEG study.  1. Mild nonspecific diffuse or multifocal cerebral dysfunction.   2. No epileptiform pattern or seizure seen.    ______________________________________________________      Prelim read     Daniel Jain MD  Epilepsy Fellow, [facility] Epilepsy [facility]   COMPREHENSIVE EPILEPSY CENTER   REPORT OF LONG-TERM VIDEO EEG     [facility]: [address], [address], [place] [address], Ph#: [phone]    Patient Name: PATI CONTRERAS  Age and : 25y ([birth year])  MRN #: [id number]  Location: [address]  Referring Physician: Adriano Prather    Start Time/Date: 16:58 on 20  End Time/Date: 08:00 on 08-15-20  Duration: 15 H     _____________________________________________________________  STUDY INFORMATION    EEG Recording Technique:  The patient underwent continuous Video-EEG monitoring, using Telemetry System hardware on the XLTek Digital System. EEG and video data were stored on a computer hard drive with important events saved in digital archive files. The material was reviewed by a physician (electroencephalographer / epileptologist) on a daily basis. Ernesto and seizure detection algorithms were utilized and reviewed. An EEG Technician attended to the patient, and was available throughout daytime work hours.  The epilepsy center neurologist was available in person or on call 24-hours per day.    EEG Placement and Labeling of Electrodes:  The EEG was performed utilizing 20 channel referential EEG connections (coronal over temporal over parasagittal montage) using all standard 10-20 electrode placements with EKG, with additional electrodes placed in the inferior temporal region using the modified 10-10 montage electrode placements for elective admissions, or if deemed necessary. Recording was at a sampling rate of 256 samples per second per channel. Time synchronized digital video recording was done simultaneously with EEG recording. A low light infrared camera was used for low light recording.     _____________________________________________________________  HISTORY    Patient is a 25y old  Male who presents with a chief complaint of Hypoxia (14 Aug 2020 10:08)      PERTINENT MEDICATION:  MEDICATIONS  (STANDING):  levETIRAcetam  IVPB 200 milliGRAM(s) IV Intermittent every 12 hours      _____________________________________________________________  STUDY INTERPRETATION    Findings: The background was continuous, spontaneously variable and reactive. During wakefulness, the posterior dominant rhythm consisted of symmetric, poorly modulated 7 Hz activity, with amplitude to 15 uV, that attenuated to eye opening.  Low amplitude frontal beta was noted in wakefulness.    Background Slowing:  Mild intermittent background slowing, theta, delta.     Focal Slowing:   None were present.	      Sleep Background:  Drowsiness and stage II sleep transients were not recorded.  Stage II sleep transients were recorded and symmetric.     Other Non-Epileptiform Findings:  None were present.      Interictal Epileptiform Activity:   None were present.    Events:  Clinical events: None recorded.  Seizures: None recorded.    Activation Procedures:   Hyperventilation was performed and did not elicit any abnormality.  Photic stimulation was performed and did not elicit any abnormality.     Artifacts:  Intermittent myogenic and movement artifacts were noted.    ECG:  The heart rate on single channel ECG was predominantly between 60-70 BPM.  _____________________________________________________________  EEG SUMMARY/CLASSIFICATION    Normal / Abnormal EEG in the awake state  - Mild generalized slowing.    _____________________________________________________________  EEG IMPRESSION/CLINICAL CORRELATE    Abnormal EEG study.  1. Mild nonspecific diffuse or multifocal cerebral dysfunction.   2. No epileptiform pattern or seizure seen.    ______________________________________________________      Prelim read     Daniel Jain MD  Epilepsy Fellow, [facility] Epilepsy [facility]   COMPREHENSIVE EPILEPSY CENTER   REPORT OF LONG-TERM VIDEO EEG     [facility]: [address], [address], [place] [address], Ph#: [phone]    Patient Name: PATI CONTRERAS  Age and : 25y ([birth year])  MRN #: [id number]  Location: [address]  Referring Physician: Adriano Prather    Start Time/Date: 16:58 on 20  End Time/Date: 08:00 on 08-15-20  Duration: 15 H     _____________________________________________________________  STUDY INFORMATION    EEG Recording Technique:  The patient underwent continuous Video-EEG monitoring, using Telemetry System hardware on the XLTek Digital System. EEG and video data were stored on a computer hard drive with important events saved in digital archive files. The material was reviewed by a physician (electroencephalographer / epileptologist) on a daily basis. Ernesto and seizure detection algorithms were utilized and reviewed. An EEG Technician attended to the patient, and was available throughout daytime work hours.  The epilepsy center neurologist was available in person or on call 24-hours per day.    EEG Placement and Labeling of Electrodes:  The EEG was performed utilizing 20 channel referential EEG connections (coronal over temporal over parasagittal montage) using all standard 10-20 electrode placements with EKG, with additional electrodes placed in the inferior temporal region using the modified 10-10 montage electrode placements for elective admissions, or if deemed necessary. Recording was at a sampling rate of 256 samples per second per channel. Time synchronized digital video recording was done simultaneously with EEG recording. A low light infrared camera was used for low light recording.     _____________________________________________________________  HISTORY    Patient is a 25y old  Male who presents with a chief complaint of Hypoxia (14 Aug 2020 10:08)      PERTINENT MEDICATION:  MEDICATIONS  (STANDING):  levETIRAcetam  IVPB 200 milliGRAM(s) IV Intermittent every 12 hours      _____________________________________________________________  STUDY INTERPRETATION    Findings: The background was continuous, spontaneously variable and reactive. During wakefulness, the posterior dominant rhythm consisted of symmetric, poorly modulated 7 Hz activity, with amplitude to 15 uV, that attenuated to eye opening.  Low amplitude frontal beta was noted in wakefulness.    Background Slowing:  Mild intermittent background slowing, theta, delta.     Focal Slowing:   None were present.	      Sleep Background:  Drowsiness and stage II sleep transients were not recorded.  Stage II sleep transients were recorded and symmetric.     Other Non-Epileptiform Findings:  None were present.      Interictal Epileptiform Activity:   None were present.    Events:  Clinical events: None recorded.  Seizures: None recorded.    Activation Procedures:   Hyperventilation was performed and did not elicit any abnormality.  Photic stimulation was performed and did not elicit any abnormality.     Artifacts:  Intermittent myogenic and movement artifacts were noted.    ECG:  The heart rate on single channel ECG was predominantly between 60-70 BPM.  _____________________________________________________________  EEG SUMMARY/CLASSIFICATION    Normal / Abnormal EEG in the awake state  - Mild generalized slowing.    _____________________________________________________________  EEG IMPRESSION/CLINICAL CORRELATE    Abnormal EEG study.  1. Mild nonspecific diffuse or multifocal cerebral dysfunction.   2. No epileptiform pattern or seizure seen.    ______________________________________________________      Daniel Jain MD  Epilepsy Fellow, [facility] Epilepsy [place]    Mehdi Gooden MD  EEG/Epilepsy Attending

## 2020-08-15 NOTE — DISCHARGE NOTE PROVIDER - NSDCCPCAREPLAN_GEN_ALL_CORE_FT
PRINCIPAL DISCHARGE DIAGNOSIS  Diagnosis: Hypoxia  Assessment and Plan of Treatment: Likely secondary to episodesof apnea,   O2 Sat now improved  Pt to have O2 NC Supplement PRN if Of O2 Sat goes below 88%      SECONDARY DISCHARGE DIAGNOSES  Diagnosis: Leukocytosis  Assessment and Plan of Treatment: No signs of infection, will Need repeat CBC in 1 week    Diagnosis: Spastic quadriparesis  Assessment and Plan of Treatment: Continue with Baclofen Pump which was refilled while in Hospital   Refill alert for 10/17    Diagnosis: Cerebral palsy  Assessment and Plan of Treatment:

## 2020-08-15 NOTE — DISCHARGE NOTE PROVIDER - NSDCMRMEDTOKEN_GEN_ALL_CORE_FT
acetaminophen 160 mg/5 mL oral liquid: 20 milliliter(s) orally every 6 hours, As Needed  albuterol 2.5 mg/3 mL (0.083%) inhalation solution: 3 milliliter(s) inhaled every 6 hours, As Needed  Biotene Mouthwash oral solution:  orally 2 times a day, As Needed  bisacodyl 10 mg rectal suppository: 1 suppository(ies) rectal once a day, As Needed if no BM in 72 hours  calcium carbonate 1250 mg/5 mL (100 mg/mL elemental calcium) oral suspension: 5 milliliter(s) orally once a day in am  clindamycin 1% topical lotion: Apply topically to affected area 2 times a day to face  ergocalciferol 8000 intl units/mL oral solution: 2 droppersful  orally 2 times a day  Eucerin topical lotion: Apply topically to affected area 2 times a day  Fleet Enema: 1  enema given rectally on tuesdays and fridays and PRN  ibuprofen 100 mg/5 mL oral suspension: 10 milliliter(s) orally every 6 hours, As Needed. Last dose 8/2/2017  ketoconazole 2% topical shampoo: Apply topically to affected area 3 times a week in pm  lactulose 10 g/15 mL oral syrup: 30 milliliter(s) orally 2 times a day  levETIRAcetam 100 mg/mL oral solution: 2 milliliter(s) orally 2 times a day  metoclopramide 5 mg oral tablet: 1 tab(s) orally 3 times a day, As Needed  midazolam 5 mg/inh nasal spray:   multivitamin: 15 milliliter(s) orally once a day in am last dose 8/2/17  omeprazole 20 mg oral delayed release tablet: 1 tab(s) orally once a day in am  polyethylene glycol 3350 oral powder for reconstitution: 17 gram(s) orally once a day in pm  proteinex- 18: 30 milliliter(s) orally once a day in am  pseudoephedrine 30 mg oral tablet: 2 tab(s) orally once a day, As Needed  senna 8.6 mg oral tablet: 2 tab(s) orally once a day (at bedtime)  Vitamin A &amp; D topical ointment: Apply topically to affected area prn with diaper changes acetaminophen 160 mg/5 mL oral liquid: 20 milliliter(s) orally every 6 hours, As Needed  albuterol 2.5 mg/3 mL (0.083%) inhalation solution: 3 milliliter(s) inhaled every 6 hours, As Needed  Biotene Mouthwash oral solution:  orally 2 times a day, As Needed  bisacodyl 10 mg rectal suppository: 1 suppository(ies) rectal once a day, As Needed if no BM in 72 hours  calcium carbonate 1250 mg/5 mL (100 mg/mL elemental calcium) oral suspension: 5 milliliter(s) orally once a day in am  clindamycin 1% topical lotion: Apply topically to affected area 2 times a day to face  ergocalciferol 8000 intl units/mL oral solution: 2 droppersful  orally 2 times a day  Eucerin topical lotion: Apply topically to affected area 2 times a day  Fleet Enema: 1  enema given rectally on tuesdays and fridays and PRN  ibuprofen 100 mg/5 mL oral suspension: 10 milliliter(s) orally every 6 hours, As Needed. Last dose 8/2/2017  ketoconazole 2% topical shampoo: Apply topically to affected area 3 times a week in pm  lactulose 10 g/15 mL oral syrup: 30 milliliter(s) orally 2 times a day  levETIRAcetam 100 mg/mL oral solution: 2 milliliter(s) orally 2 times a day  metoclopramide 5 mg oral tablet: 1 tab(s) orally 3 times a day, As Needed  metoprolol tartrate 25 mg oral tablet: 1 tab(s) orally every 12 hours  midazolam 5 mg/inh nasal spray:   multivitamin: 15 milliliter(s) orally once a day in am last dose 8/2/17  omeprazole 20 mg oral delayed release tablet: 1 tab(s) orally once a day in am  polyethylene glycol 3350 oral powder for reconstitution: 17 gram(s) orally once a day in pm  proteinex- 18: 30 milliliter(s) orally once a day in am  pseudoephedrine 30 mg oral tablet: 2 tab(s) orally once a day, As Needed  senna 8.6 mg oral tablet: 2 tab(s) orally once a day (at bedtime)  Vitamin A &amp; D topical ointment: Apply topically to affected area prn with diaper changes albuterol 2.5 mg/3 mL (0.083%) inhalation solution: 3 milliliter(s) inhaled every 6 hours, As Needed  Biotene Mouthwash oral solution:  orally 2 times a day, As Needed  bisacodyl 10 mg rectal suppository: 1 suppository(ies) rectal once a day, As Needed if no BM in 72 hours  ergocalciferol 8000 intl units/mL oral solution: 2 droppersful  orally 2 times a day  Fleet Enema: 1  enema given rectally on tuesdays and fridays and PRN  lactulose 10 g/15 mL oral syrup: 30 milliliter(s) orally 2 times a day  levETIRAcetam 100 mg/mL oral solution: 2 milliliter(s) orally 2 times a day  multivitamin: 15 milliliter(s) orally once a day in am last dose 8/2/17  omeprazole 20 mg oral delayed release tablet: 1 tab(s) orally once a day in am  polyethylene glycol 3350 oral powder for reconstitution: 17 gram(s) orally once a day in pm  proteinex- 18: 30 milliliter(s) orally once a day in am  senna 8.6 mg oral tablet: 2 tab(s) orally once a day (at bedtime)

## 2020-08-15 NOTE — EEG REPORT - NS EEG TEXT BOX
[facility]   COMPREHENSIVE EPILEPSY CENTER   REPORT OF LONG-TERM VIDEO EEG     [facility]: [address], [place] [address], Ph#: [phone]    Patient Name: PATI CONTRERAS  Age and : 25y ([birth year])  MRN #: [id number]  Location: [address]  Referring Physician: Adriano Prather    Start Time/Date: 08:00 on 08-15-20  Duration: [id number]    _____________________________________________________________  STUDY INFORMATION    EEG Recording Technique:  The patient underwent continuous Video-EEG monitoring, using Telemetry System hardware on the XLTek Digital System. EEG and video data were stored on a computer hard drive with important events saved in digital archive files. The material was reviewed by a physician (electroencephalographer / epileptologist) on a daily basis. Ernesto and seizure detection algorithms were utilized and reviewed. An EEG Technician attended to the patient, and was available throughout daytime work hours.  The epilepsy center neurologist was available in person or on call 24-hours per day.    EEG Placement and Labeling of Electrodes:  The EEG was performed utilizing 20 channel referential EEG connections (coronal over temporal over parasagittal montage) using all standard 10-20 electrode placements with EKG, with additional electrodes placed in the inferior temporal region using the modified 10-10 montage electrode placements for elective admissions, or if deemed necessary. Recording was at a sampling rate of 256 samples per second per channel. Time synchronized digital video recording was done simultaneously with EEG recording. A low light infrared camera was used for low light recording.     _____________________________________________________________  HISTORY    Patient is a 25y old  Male who presents with a chief complaint of Hypoxia (14 Aug 2020 10:08)      PERTINENT MEDICATION:  MEDICATIONS  (STANDING):  levETIRAcetam  IVPB 200 milliGRAM(s) IV Intermittent every 12 hours      _____________________________________________________________  STUDY INTERPRETATION    Findings: The background was continuous, spontaneously variable and reactive. During wakefulness, the posterior dominant rhythm consisted of symmetric, poorly modulated 7 Hz activity, with amplitude to 15 uV, that attenuated to eye opening.  Low amplitude frontal beta was noted in wakefulness.    Background Slowing:  Mild intermittent background slowing, theta, delta.     Focal Slowing:   None were present.	      Sleep Background:  Drowsiness and stage II sleep transients were not recorded.  Stage II sleep transients were recorded and symmetric.     Other Non-Epileptiform Findings:  None were present.      Interictal Epileptiform Activity:   None were present.    Events:  Clinical events: None recorded.  Seizures: None recorded.    Activation Procedures:   Hyperventilation was performed and did not elicit any abnormality.  Photic stimulation was performed and did not elicit any abnormality.     Artifacts:  Intermittent myogenic and movement artifacts were noted.    ECG:  The heart rate on single channel ECG was predominantly between 60-70 BPM.  _____________________________________________________________  EEG SUMMARY/CLASSIFICATION    Normal / Abnormal EEG in the awake state  - Mild generalized slowing.    _____________________________________________________________  EEG IMPRESSION/CLINICAL CORRELATE    Abnormal EEG study.  1. Mild nonspecific diffuse or multifocal cerebral dysfunction.   2. No epileptiform pattern or seizure seen.    ______________________________________________________      Daniel Jain MD  Epilepsy Fellow, [facility]    Mehdi Gooden MD  EEG/Epilepsy Attending

## 2020-08-16 ENCOUNTER — TRANSCRIPTION ENCOUNTER (OUTPATIENT)
Age: 25
End: 2020-08-16

## 2020-08-16 VITALS
HEART RATE: 91 BPM | OXYGEN SATURATION: 95 % | SYSTOLIC BLOOD PRESSURE: 120 MMHG | TEMPERATURE: 98 F | DIASTOLIC BLOOD PRESSURE: 70 MMHG | RESPIRATION RATE: 16 BRPM

## 2020-08-16 LAB
ALBUMIN SERPL ELPH-MCNC: 3.8 G/DL — SIGNIFICANT CHANGE UP (ref 3.3–5)
ALP SERPL-CCNC: 91 U/L — SIGNIFICANT CHANGE UP (ref 40–120)
ALT FLD-CCNC: 26 U/L — SIGNIFICANT CHANGE UP (ref 4–41)
ANION GAP SERPL CALC-SCNC: 14 MMO/L — SIGNIFICANT CHANGE UP (ref 7–14)
AST SERPL-CCNC: 21 U/L — SIGNIFICANT CHANGE UP (ref 4–40)
BILIRUB SERPL-MCNC: 0.4 MG/DL — SIGNIFICANT CHANGE UP (ref 0.2–1.2)
BUN SERPL-MCNC: 16 MG/DL — SIGNIFICANT CHANGE UP (ref 7–23)
CALCIUM SERPL-MCNC: 9.2 MG/DL — SIGNIFICANT CHANGE UP (ref 8.4–10.5)
CHLORIDE SERPL-SCNC: 104 MMOL/L — SIGNIFICANT CHANGE UP (ref 98–107)
CO2 SERPL-SCNC: 25 MMOL/L — SIGNIFICANT CHANGE UP (ref 22–31)
CREAT SERPL-MCNC: 0.43 MG/DL — LOW (ref 0.5–1.3)
GLUCOSE SERPL-MCNC: 93 MG/DL — SIGNIFICANT CHANGE UP (ref 70–99)
HCT VFR BLD CALC: 40.2 % — SIGNIFICANT CHANGE UP (ref 39–50)
HGB BLD-MCNC: 12.3 G/DL — LOW (ref 13–17)
MAGNESIUM SERPL-MCNC: 1.9 MG/DL — SIGNIFICANT CHANGE UP (ref 1.6–2.6)
MCHC RBC-ENTMCNC: 28.1 PG — SIGNIFICANT CHANGE UP (ref 27–34)
MCHC RBC-ENTMCNC: 30.6 % — LOW (ref 32–36)
MCV RBC AUTO: 92 FL — SIGNIFICANT CHANGE UP (ref 80–100)
NRBC # FLD: 0 K/UL — SIGNIFICANT CHANGE UP (ref 0–0)
PHOSPHATE SERPL-MCNC: 2.7 MG/DL — SIGNIFICANT CHANGE UP (ref 2.5–4.5)
PLATELET # BLD AUTO: 243 K/UL — SIGNIFICANT CHANGE UP (ref 150–400)
PMV BLD: 13.1 FL — HIGH (ref 7–13)
POTASSIUM SERPL-MCNC: 4 MMOL/L — SIGNIFICANT CHANGE UP (ref 3.5–5.3)
POTASSIUM SERPL-SCNC: 4 MMOL/L — SIGNIFICANT CHANGE UP (ref 3.5–5.3)
PROT SERPL-MCNC: 6.7 G/DL — SIGNIFICANT CHANGE UP (ref 6–8.3)
RBC # BLD: 4.37 M/UL — SIGNIFICANT CHANGE UP (ref 4.2–5.8)
RBC # FLD: 13.2 % — SIGNIFICANT CHANGE UP (ref 10.3–14.5)
SODIUM SERPL-SCNC: 143 MMOL/L — SIGNIFICANT CHANGE UP (ref 135–145)
WBC # BLD: 14.79 K/UL — HIGH (ref 3.8–10.5)
WBC # FLD AUTO: 14.79 K/UL — HIGH (ref 3.8–10.5)

## 2020-08-16 PROCEDURE — 99239 HOSP IP/OBS DSCHRG MGMT >30: CPT

## 2020-08-16 RX ORDER — KETOCONAZOLE 20 MG/G
1 AEROSOL, FOAM TOPICAL
Qty: 0 | Refills: 0 | DISCHARGE

## 2020-08-16 RX ORDER — PSEUDOEPHEDRINE HCL 30 MG
2 TABLET ORAL
Qty: 0 | Refills: 0 | DISCHARGE

## 2020-08-16 RX ORDER — LANOLIN/MINERAL OIL
1 LOTION (ML) TOPICAL
Qty: 0 | Refills: 0 | DISCHARGE

## 2020-08-16 RX ORDER — METOCLOPRAMIDE HCL 10 MG
1 TABLET ORAL
Qty: 0 | Refills: 0 | DISCHARGE

## 2020-08-16 RX ORDER — MIDAZOLAM HYDROCHLORIDE 1 MG/ML
0 INJECTION, SOLUTION INTRAMUSCULAR; INTRAVENOUS
Qty: 0 | Refills: 0 | DISCHARGE

## 2020-08-16 RX ORDER — ACETAMINOPHEN 500 MG
20 TABLET ORAL
Qty: 0 | Refills: 0 | DISCHARGE

## 2020-08-16 RX ORDER — IBUPROFEN 200 MG
10 TABLET ORAL
Qty: 0 | Refills: 0 | DISCHARGE

## 2020-08-16 RX ORDER — METOPROLOL TARTRATE 50 MG
1 TABLET ORAL
Qty: 0 | Refills: 0 | DISCHARGE
Start: 2020-08-16

## 2020-08-16 RX ORDER — CALCIUM CARBONATE 500(1250)
5 TABLET ORAL
Qty: 0 | Refills: 0 | DISCHARGE

## 2020-08-16 RX ORDER — CLINDAMYCIN PHOSPHATE GEL USP, 1% 10 MG/G
1 GEL TOPICAL
Qty: 0 | Refills: 0 | DISCHARGE

## 2020-08-16 RX ADMIN — LEVETIRACETAM 408 MILLIGRAM(S): 250 TABLET, FILM COATED ORAL at 01:49

## 2020-08-16 RX ADMIN — MUPIROCIN 1 APPLICATION(S): 20 OINTMENT TOPICAL at 07:26

## 2020-08-16 RX ADMIN — MUPIROCIN 1 APPLICATION(S): 20 OINTMENT TOPICAL at 14:43

## 2020-08-16 RX ADMIN — LEVETIRACETAM 408 MILLIGRAM(S): 250 TABLET, FILM COATED ORAL at 13:18

## 2020-08-16 NOTE — CHART NOTE - NSCHARTNOTEFT_GEN_A_CORE
BP noted to be elevated - discussed with attending - patient is on no HTN meds at home.   Will start lopressor 25mg PO BID for now and monitor.  Above events and plan discussed with Dr. Prather.    Informed by RN that patient O2% 89 on 2L - will titrate up to 4L and monitor closely.  Patient is in no distress.   WIll endorse as above to night team for close monitoring.
EEG report reviewed. So seizures or epileptiform activity noted. Symptoms likely due to issue with baclofen pump. Recommend interval monitoring as it is possible for patient to have baclofen withdrawal if medication runs out. Continue home keppra dose. Recommend outpatient neurology follow up.
Notified by  that Group [facility] does not take patient transfers on Sundays, therefore will Plan for transfer in am.
Patient desatted earlier to 85% on room air. Now maintaining at 98% on 2L via NC.   DC postponed.   Discussed with neuro who felt that this hypoxia is not likely coming from a neurological etiology.   Will check CXR.   Continue with continous pulse ox.   Above events and plan discussed with attending.
pt seen and eval by me  Dad at bedside, states he was called over night bc the group home was unable to provide a  for pt over night and pt was agitated  pt now sleeping after he ate well per Dad  VSS afeb, no further desats  Dad says this is a long standing issue and feels it is best just to get [place] back to his group home  states pt has had many EEGs and they can't find the reason for these episodes  He feels pt is back to baseline and would like him to get back to his usual routine ASAP  spoke with ACP to d/w SW for transportation back to the group home  mild elevation of WBC; pt afeb, non toxic  ACP confirmed group home has O2 if needed  advised repeat CBC in 1 week to trend WBC  medically stable for dc  33 min spent with dc planning
Late note entry    Notified by RN that patient drowsy but arousable. Patient seen and examined at bedside. Patient sleeping deeply but spontaneously moving LUE. Patient lethargic but arousable to tactile stimuli. Patients vital signs stable, saturating well on 2L NC. Will continue to monitor patient closely.     Vital Signs Last 24 Hrs  T(C): 37.1 (15 Aug 2020 21:35), Max: 37.1 (15 Aug 2020 21:35)  T(F): 98.8 (15 Aug 2020 21:35), Max: 98.8 (15 Aug 2020 21:35)  HR: 81 (15 Aug 2020 21:35) (79 - 99)  BP: 108/60 (15 Aug 2020 21:35) (103/61 - 126/78)  BP(mean): --  RR: 16 (15 Aug 2020 21:35) (16 - 17)  SpO2: 96% (15 Aug 2020 21:35) (94% - 100%)

## 2020-08-16 NOTE — PROGRESS NOTE ADULT - PROBLEM SELECTOR PLAN 1
poss due to apnea  Dad wishes for conservative management with oxygen PRN if O2 sat less than 88% or to be checked if he is in distress  just wants [place] "to be who he is"

## 2020-08-16 NOTE — PROGRESS NOTE ADULT - SUBJECTIVE AND OBJECTIVE BOX
[facility] Division of Hospital Medicine  Daria Eduardo MD  Pager [phone]    Patient is a 25y old  Male who presents with a chief complaint of Hypoxia       SUBJECTIVE / OVERNIGHT EVENTS: dc held yesterday for another hypoxic episode during deep sleep; long d/w Dad this AM about GOC and role for further eval vs PRN oxygen at group home; Dad wishes for conservative management with PRN oxygen if O2 sat less than 88%; He states he just wants [place] to be who he is with minimal intervention; d/w Dad possibilities of needing CPAP vs bipap after formal eval and he wishes to try this option for now, which I think is reasonable as pt is DNI but not DNR; also unclear if pt would tolerate NIV' goal is for quality of life      MEDICATIONS  (STANDING):  levETIRAcetam  IVPB 200 milliGRAM(s) IV Intermittent every 12 hours  metoprolol tartrate 25 milliGRAM(s) Oral every 12 hours  mupirocin 2% Ointment 1 Application(s) Topical three times a day    MEDICATIONS  (PRN):  LORazepam   Injectable 2 milliGRAM(s) IV Push every 6 hours PRN seizures                PHYSICAL EXAM:  Vital Signs Last 24 Hrs  T(F): 98 (16 Aug 2020 11:24), Max: 98.8 (15 Aug 2020 21:35)  HR: 97 (16 Aug 2020 11:24) (67 - 99)  BP: 125/72 (16 Aug 2020 11:24) (99/60 - 126/78)  RR: 16 (16 Aug 2020 11:24) (16 - 17)  SpO2: 95% (16 Aug 2020 11:24) (95% - 99%)    CONSTITUTIONAL: NAD, thin  RESPIRATORY: Normal respiratory effort; b/l AE coarse BS  CARDIOVASCULAR: Regular rate and rhythm; No lower extremity edema;   ABDOMEN: Nontender to palpation, normoactive bowel sounds  MUSCULOSKELETAL:  contractures  PSYCH: calm      LABS:                        12.3   14.79 )-----------( 243      ( 16 Aug 2020 07:40 )             40.2     08-16    143  |  104  |  16  ----------------------------<  93  4.0   |  25  |  0.43<L>    Ca    9.2      16 Aug 2020 07:40  Phos  2.7     08-16  Mg     1.9     08-16    TPro  6.7  /  Alb  3.8  /  TBili  0.4  /  DBili  x   /  AST  21  /  ALT  26  /  AlkPhos  91  08-16

## 2020-08-16 NOTE — PROVIDER CONTACT NOTE (OTHER) - BACKGROUND
Pt is a 22 y/o, male, who came to ED due to hypoxia
Patient admitted for hypoxemia on 8/13/2020. PMH of cerebral palsy, seizures, spastic quadriplegia. Patient has a baclofen pump implanted on [facility].
Patient admitted for hypoxemia on 8/13/2020. PMH of cerebral palsy, seizures, spastic quadriplegia. Patient has a baclofen pump implanted on [facility].
Patient was admitted with hypoxemia. Patient has a history of cerebral palsy, non-verbal, and cortical blindness.
Pt admitted for hypoxia, history of Cerebral Palsy, Baclofen pump changed 8/13

## 2020-08-16 NOTE — DISCHARGE NOTE NURSING/CASE MANAGEMENT/SOCIAL WORK - PATIENT PORTAL LINK FT
You can access the FollowMyHealth Patient Portal offered by [facility] by registering at the following website: [email or web address]. By joining [employer]’s FollowMyHealth portal, you will also be able to view your health information using other applications (apps) compatible with our system.

## 2020-08-16 NOTE — PROVIDER CONTACT NOTE (OTHER) - ASSESSMENT
Per provider, Adriano Rai MD, pt is cleared and is able to return to his previous residence, [facility] [address] [place] [address]. SW spoke to Alexandrea Wright, , via [phone] who confirmed that pt's mode of transportation is ambulance and that pt travels w/ supervision. Clinical provider is in agreement with ambulance back to [facility]. Verbal huddle completed.     Transportation coordinated [facility],  at 2AM Trip #179A. [facility] Invoice #[id number].
No signs or symptoms of distress noted. Patient is currently asymptomatic. Vitals documented.
Pt A&Ox0, nonverbal, sleeping, infrequent cough noted, no sx of respiratory distress noted
There are non-verbal indicators of distress absent.
There are non-verbal indicators of distress absent.

## 2020-10-08 DIAGNOSIS — G47.9 SLEEP DISORDER, UNSPECIFIED: ICD-10-CM

## 2021-01-24 DIAGNOSIS — Z01.818 ENCOUNTER FOR OTHER PREPROCEDURAL EXAMINATION: ICD-10-CM

## 2021-01-26 ENCOUNTER — APPOINTMENT (OUTPATIENT)
Dept: DISASTER EMERGENCY | Facility: CLINIC | Age: 26
End: 2021-01-26

## 2021-01-27 LAB — SARS-COV-2 N GENE NPH QL NAA+PROBE: NOT DETECTED

## 2021-01-29 ENCOUNTER — APPOINTMENT (OUTPATIENT)
Dept: SLEEP CENTER | Facility: CLINIC | Age: 26
End: 2021-01-29
Payer: MEDICAID

## 2021-01-29 ENCOUNTER — OUTPATIENT (OUTPATIENT)
Dept: OUTPATIENT SERVICES | Facility: HOSPITAL | Age: 26
LOS: 1 days | End: 2021-01-29
Payer: MEDICAID

## 2021-01-29 DIAGNOSIS — G80.0 SPASTIC QUADRIPLEGIC CEREBRAL PALSY: Chronic | ICD-10-CM

## 2021-01-29 DIAGNOSIS — Z98.890 OTHER SPECIFIED POSTPROCEDURAL STATES: Chronic | ICD-10-CM

## 2021-01-29 PROCEDURE — 95810 POLYSOM 6/> YRS 4/> PARAM: CPT

## 2021-01-29 PROCEDURE — 95810 POLYSOM 6/> YRS 4/> PARAM: CPT | Mod: 26

## 2021-02-02 DIAGNOSIS — G47.33 OBSTRUCTIVE SLEEP APNEA (ADULT) (PEDIATRIC): ICD-10-CM

## 2022-10-11 ENCOUNTER — EMERGENCY (EMERGENCY)
Facility: HOSPITAL | Age: 27
LOS: 1 days | Discharge: HOME | End: 2022-10-11
Attending: STUDENT IN AN ORGANIZED HEALTH CARE EDUCATION/TRAINING PROGRAM | Admitting: STUDENT IN AN ORGANIZED HEALTH CARE EDUCATION/TRAINING PROGRAM

## 2022-10-11 VITALS
DIASTOLIC BLOOD PRESSURE: 74 MMHG | OXYGEN SATURATION: 99 % | SYSTOLIC BLOOD PRESSURE: 107 MMHG | HEIGHT: 66 IN | RESPIRATION RATE: 16 BRPM | TEMPERATURE: 98 F | HEART RATE: 88 BPM

## 2022-10-11 DIAGNOSIS — Z98.890 OTHER SPECIFIED POSTPROCEDURAL STATES: Chronic | ICD-10-CM

## 2022-10-11 DIAGNOSIS — G80.0 SPASTIC QUADRIPLEGIC CEREBRAL PALSY: Chronic | ICD-10-CM

## 2022-10-11 LAB
ALBUMIN SERPL ELPH-MCNC: 4.1 G/DL — SIGNIFICANT CHANGE UP (ref 3.3–5)
ALP SERPL-CCNC: 86 U/L — SIGNIFICANT CHANGE UP (ref 40–120)
ALT FLD-CCNC: 12 U/L — SIGNIFICANT CHANGE UP (ref 4–41)
ANION GAP SERPL CALC-SCNC: 12 MMOL/L — SIGNIFICANT CHANGE UP (ref 7–14)
AST SERPL-CCNC: 23 U/L — SIGNIFICANT CHANGE UP (ref 4–40)
BASOPHILS # BLD AUTO: 0.01 K/UL — SIGNIFICANT CHANGE UP (ref 0–0.2)
BASOPHILS NFR BLD AUTO: 0.1 % — SIGNIFICANT CHANGE UP (ref 0–2)
BILIRUB SERPL-MCNC: 0.3 MG/DL — SIGNIFICANT CHANGE UP (ref 0.2–1.2)
BUN SERPL-MCNC: 19 MG/DL — SIGNIFICANT CHANGE UP (ref 7–23)
CALCIUM SERPL-MCNC: 9.8 MG/DL — SIGNIFICANT CHANGE UP (ref 8.4–10.5)
CHLORIDE SERPL-SCNC: 101 MMOL/L — SIGNIFICANT CHANGE UP (ref 98–107)
CO2 SERPL-SCNC: 26 MMOL/L — SIGNIFICANT CHANGE UP (ref 22–31)
CREAT SERPL-MCNC: 0.62 MG/DL — SIGNIFICANT CHANGE UP (ref 0.5–1.3)
EGFR: 134 ML/MIN/1.73M2 — SIGNIFICANT CHANGE UP
EOSINOPHIL # BLD AUTO: 0.04 K/UL — SIGNIFICANT CHANGE UP (ref 0–0.5)
EOSINOPHIL NFR BLD AUTO: 0.5 % — SIGNIFICANT CHANGE UP (ref 0–6)
GLUCOSE SERPL-MCNC: 91 MG/DL — SIGNIFICANT CHANGE UP (ref 70–99)
HCT VFR BLD CALC: 40.9 % — SIGNIFICANT CHANGE UP (ref 39–50)
HGB BLD-MCNC: 13.2 G/DL — SIGNIFICANT CHANGE UP (ref 13–17)
IANC: 4.73 K/UL — SIGNIFICANT CHANGE UP (ref 1.8–7.4)
IMM GRANULOCYTES NFR BLD AUTO: 0.3 % — SIGNIFICANT CHANGE UP (ref 0–0.9)
LYMPHOCYTES # BLD AUTO: 2.48 K/UL — SIGNIFICANT CHANGE UP (ref 1–3.3)
LYMPHOCYTES # BLD AUTO: 31.1 % — SIGNIFICANT CHANGE UP (ref 13–44)
MCHC RBC-ENTMCNC: 28.6 PG — SIGNIFICANT CHANGE UP (ref 27–34)
MCHC RBC-ENTMCNC: 32.3 GM/DL — SIGNIFICANT CHANGE UP (ref 32–36)
MCV RBC AUTO: 88.7 FL — SIGNIFICANT CHANGE UP (ref 80–100)
MONOCYTES # BLD AUTO: 0.7 K/UL — SIGNIFICANT CHANGE UP (ref 0–0.9)
MONOCYTES NFR BLD AUTO: 8.8 % — SIGNIFICANT CHANGE UP (ref 2–14)
NEUTROPHILS # BLD AUTO: 4.73 K/UL — SIGNIFICANT CHANGE UP (ref 1.8–7.4)
NEUTROPHILS NFR BLD AUTO: 59.2 % — SIGNIFICANT CHANGE UP (ref 43–77)
NRBC # BLD: 0 /100 WBCS — SIGNIFICANT CHANGE UP (ref 0–0)
NRBC # FLD: 0 K/UL — SIGNIFICANT CHANGE UP (ref 0–0)
PLATELET # BLD AUTO: 263 K/UL — SIGNIFICANT CHANGE UP (ref 150–400)
POTASSIUM SERPL-MCNC: 4.3 MMOL/L — SIGNIFICANT CHANGE UP (ref 3.5–5.3)
POTASSIUM SERPL-SCNC: 4.3 MMOL/L — SIGNIFICANT CHANGE UP (ref 3.5–5.3)
PROT SERPL-MCNC: 7.3 G/DL — SIGNIFICANT CHANGE UP (ref 6–8.3)
RBC # BLD: 4.61 M/UL — SIGNIFICANT CHANGE UP (ref 4.2–5.8)
RBC # FLD: 13.2 % — SIGNIFICANT CHANGE UP (ref 10.3–14.5)
SODIUM SERPL-SCNC: 139 MMOL/L — SIGNIFICANT CHANGE UP (ref 135–145)
WBC # BLD: 7.98 K/UL — SIGNIFICANT CHANGE UP (ref 3.8–10.5)
WBC # FLD AUTO: 7.98 K/UL — SIGNIFICANT CHANGE UP (ref 3.8–10.5)

## 2022-10-11 PROCEDURE — 99285 EMERGENCY DEPT VISIT HI MDM: CPT

## 2022-10-11 RX ORDER — KETOROLAC TROMETHAMINE 30 MG/ML
15 SYRINGE (ML) INJECTION ONCE
Refills: 0 | Status: DISCONTINUED | OUTPATIENT
Start: 2022-10-11 | End: 2022-10-11

## 2022-10-11 RX ORDER — SODIUM CHLORIDE 9 MG/ML
1000 INJECTION INTRAMUSCULAR; INTRAVENOUS; SUBCUTANEOUS ONCE
Refills: 0 | Status: COMPLETED | OUTPATIENT
Start: 2022-10-11 | End: 2022-10-11

## 2022-10-11 RX ADMIN — Medication 15 MILLIGRAM(S): at 22:59

## 2022-10-11 RX ADMIN — SODIUM CHLORIDE 1000 MILLILITER(S): 9 INJECTION INTRAMUSCULAR; INTRAVENOUS; SUBCUTANEOUS at 22:59

## 2022-10-11 NOTE — ED PROVIDER NOTE - OBJECTIVE STATEMENT
28 y/o Male wheelchair dependent, non-verbal with PMHx of chronic constipation and cerebral palsy presents to the ED with complaints of swollen baclofen pump. Patient history obtained from [facility]. No allergies to medications. 28 y/o Male hx of cerebral palsy - non-verbal, spastic quadriparesis on baclofen pump, SZ D/O wheelchair dependent presents to the ED with aide for concerns of swelling and redness to an area on his baclofen pump. Patient history obtained from [facility]. No allergies to medications.

## 2022-10-11 NOTE — ED ADULT TRIAGE NOTE - CHIEF COMPLAINT QUOTE
Pt brought in by EMS from group home for swelling around baclofen pump, Pt non verbal at baseline. staff with pt denies fevers.

## 2022-10-11 NOTE — ED PROVIDER NOTE - CLINICAL SUMMARY MEDICAL DECISION MAKING FREE TEXT BOX
28 y/o male presenting with swollen baclofen pump. Will obtain labs and CT scan of Abdomen to determine whether or not swelling is superficial. Will reassess at a later time. 26 y/o male presenting with area of erythema and fluctuance on suture line of baclofen pump. Will obtain labs and CT scan of Abdomen to determine depth of abscess present. Start Abx, dispo pending.

## 2022-10-11 NOTE — ED PROVIDER NOTE - ATTENDING APP SHARED VISIT CONTRIBUTION OF CARE
26 yo M hx CP, non-verbal at baseline, seizure disorder, spastic quadriparesis on baclofen pump, sent in for evaluation of erythema surrounding baclofen pump. Per staff, pt appears uncomfortable. On exam, small area of erythema localized to 9 o'clock position superior to baclofen pump- POCUS without cobblestoning, no evidence of deeper abscess. Pt grimacing with palpation on abdominal exam, will obtain CT abdomen r/o infection--- consent obtained by phone from patient's father. Disp per findings

## 2022-10-11 NOTE — ED PROVIDER NOTE - NS ED ATTENDING STATEMENT MOD
This was a shared visit with the DILIP. I reviewed and verified the documentation and independently performed the documented:

## 2022-10-11 NOTE — ED ADULT NURSE NOTE - OBJECTIVE STATEMENT
Non verbal, wheelchair bound 28 y/o male presents to ED for swelling next to baclofen pump. According to group home aide, patient has been moaning in more pain than usual. Patient was asleep during assessment.

## 2022-10-11 NOTE — ED PROVIDER NOTE - PHYSICAL EXAMINATION
CONSTITUTIONAL: Well-appearing; well-nourished; in no apparent distress. Non-toxic appearing.   NEURO: Alert. Arms held in flexion, leg extended.   CARD: Regular rate and rhythm, no murmurs  RESP: No accessory muscle use; breath sounds clear and equal bilaterally; no wheezes, rhonchi, or rales     ABD: Soft, non-distended. To RLQ there is a baclofen pump, to superior lateral aspect of the pump along suture line there is an ~1.5 cm well demarcated area of erythema and fluctuance; wince to palpation of the area.    MUSCULOSKELETAL/EXTREMITIES: No extremity edema.  SKIN: As noted above otherwise skin is warm; dry; no apparent lesions or exudate

## 2022-10-11 NOTE — ED PROVIDER NOTE - PATIENT PORTAL LINK FT
You can access the FollowMyHealth Patient Portal offered by [facility] by registering at the following website: [email or web address]. By joining [employer]’s FollowMyHealth portal, you will also be able to view your health information using other applications (apps) compatible with our system.

## 2022-10-11 NOTE — ED PROVIDER NOTE - NSFOLLOWUPINSTRUCTIONS_ED_ALL_ED_FT
Abscess    An abscess is an infected area that contains a collection of pus and debris. It can occur in almost any part of the body and occurs when the tissue gets infection. Symptoms include a painful mass that is red, warm, tender that might break open and HAVE drainage. If your health care provider gave you antibiotics make sure to take the full course and do not stop even if feeling better.     SEEK IMMEDIATE MEDICAL CARE IF YOU HAVE ANY OF THE FOLLOWING SYMPTOMS: chills, fever, muscle aches, or red streaking from the area.     Take Antibiotics as prescribed until completed.    Constipation    Constipation is when a person has fewer than three bowel movements a week, has difficulty having a bowel movement, or has stools that are dry, hard, or larger than normal. Other symptoms can include abdominal pain or bloating. As people grow older, constipation is more common. A low-fiber diet, not taking in enough fluids, and taking certain medicines, including opioid painkillers, may make constipation worse. Treatment varies but may include dietary modifications (more fiber-rich foods), lifestyle modifications, and possible medications.     SEEK IMMEDIATE MEDICAL CARE IF YOU HAVE ANY OF THE FOLLOWING SYMPTOMS: bright red blood in your stool, constipation for longer than 4 days, abdominal or rectal pain, unexplained weight loss, or inability to pass gas.

## 2022-10-12 VITALS
RESPIRATION RATE: 16 BRPM | SYSTOLIC BLOOD PRESSURE: 107 MMHG | DIASTOLIC BLOOD PRESSURE: 78 MMHG | HEART RATE: 65 BPM | OXYGEN SATURATION: 100 %

## 2022-10-12 LAB
APPEARANCE UR: CLEAR — SIGNIFICANT CHANGE UP
BACTERIA # UR AUTO: NEGATIVE — SIGNIFICANT CHANGE UP
BILIRUB UR-MCNC: NEGATIVE — SIGNIFICANT CHANGE UP
COLOR SPEC: YELLOW — SIGNIFICANT CHANGE UP
DIFF PNL FLD: NEGATIVE — SIGNIFICANT CHANGE UP
EPI CELLS # UR: 8 /HPF — HIGH (ref 0–5)
GLUCOSE UR QL: NEGATIVE — SIGNIFICANT CHANGE UP
HYALINE CASTS # UR AUTO: 4 /LPF — SIGNIFICANT CHANGE UP (ref 0–7)
KETONES UR-MCNC: NEGATIVE — SIGNIFICANT CHANGE UP
LEUKOCYTE ESTERASE UR-ACNC: NEGATIVE — SIGNIFICANT CHANGE UP
NITRITE UR-MCNC: NEGATIVE — SIGNIFICANT CHANGE UP
PH UR: 6 — SIGNIFICANT CHANGE UP (ref 5–8)
PROT UR-MCNC: ABNORMAL
RBC CASTS # UR COMP ASSIST: 8 /HPF — HIGH (ref 0–4)
SP GR SPEC: 1.02 — SIGNIFICANT CHANGE UP (ref 1.01–1.05)
UROBILINOGEN FLD QL: SIGNIFICANT CHANGE UP
WBC UR QL: 5 /HPF — SIGNIFICANT CHANGE UP (ref 0–5)

## 2022-10-12 PROCEDURE — 74177 CT ABD & PELVIS W/CONTRAST: CPT | Mod: 26,MA

## 2022-10-12 RX ADMIN — Medication 600 MILLIGRAM(S): at 07:16

## 2022-10-12 NOTE — PROVIDER CONTACT NOTE (OTHER) - RECOMMENDATIONS
Pt to travel back to [facility] home via ambulance.  Verbal huddle complete.

## 2022-10-12 NOTE — PROVIDER CONTACT NOTE (OTHER) - ACTION/TREATMENT ORDERED:
[facility] arranged for transportation-[facility] auth # [id number].  ETA 6:0 0a.m. trip # 164A. [facility] arranged for transportation-[facility] auth # [id number].  ETA 7:00 a.m. trip # 164A.

## 2022-10-12 NOTE — ED ADULT NURSE REASSESSMENT NOTE - NS ED NURSE REASSESS COMMENT FT1
Pt a&ox4, medicated as per MD orders. Respirations are even and unlabored, no signs of respiratory distress.

## 2022-10-12 NOTE — PROVIDER CONTACT NOTE (OTHER) - ASSESSMENT
Pt is from [facility] group [facility].  [phone]; Sw called residence and spoke with staff member, Cuca, who confirms that pt is a resident and can return.  As per Cuca, pt travels with staff supervision in an ambulance to return to the facility.  Discussed with provider, who is aware and in agreement with the plan.

## 2022-10-13 LAB
CULTURE RESULTS: NO GROWTH — SIGNIFICANT CHANGE UP
SPECIMEN SOURCE: SIGNIFICANT CHANGE UP

## 2022-10-14 NOTE — ED POST DISCHARGE NOTE - REASON FOR FOLLOW-UP
Pt PMD office contacted ED to have labs and radiology results faxed to Dr. Morrissey Fax # [phone].  Fax confirmation sheet received. Other

## 2023-01-12 ENCOUNTER — OUTPATIENT (OUTPATIENT)
Dept: OUTPATIENT SERVICES | Facility: HOSPITAL | Age: 28
LOS: 1 days | End: 2023-01-12

## 2023-01-12 VITALS
DIASTOLIC BLOOD PRESSURE: 59 MMHG | HEIGHT: 66 IN | WEIGHT: 93.04 LBS | HEART RATE: 95 BPM | TEMPERATURE: 98 F | RESPIRATION RATE: 20 BRPM | SYSTOLIC BLOOD PRESSURE: 112 MMHG | OXYGEN SATURATION: 96 %

## 2023-01-12 DIAGNOSIS — Z91.89 OTHER SPECIFIED PERSONAL RISK FACTORS, NOT ELSEWHERE CLASSIFIED: ICD-10-CM

## 2023-01-12 DIAGNOSIS — R25.2 CRAMP AND SPASM: ICD-10-CM

## 2023-01-12 DIAGNOSIS — Z98.890 OTHER SPECIFIED POSTPROCEDURAL STATES: Chronic | ICD-10-CM

## 2023-01-12 DIAGNOSIS — Z01.812 ENCOUNTER FOR PREPROCEDURAL LABORATORY EXAMINATION: ICD-10-CM

## 2023-01-12 DIAGNOSIS — G80.9 CEREBRAL PALSY, UNSPECIFIED: ICD-10-CM

## 2023-01-12 DIAGNOSIS — G80.0 SPASTIC QUADRIPLEGIC CEREBRAL PALSY: Chronic | ICD-10-CM

## 2023-01-12 DIAGNOSIS — R56.9 UNSPECIFIED CONVULSIONS: ICD-10-CM

## 2023-01-12 LAB
ALBUMIN SERPL ELPH-MCNC: 4.3 G/DL — SIGNIFICANT CHANGE UP (ref 3.3–5)
ALP SERPL-CCNC: 96 U/L — SIGNIFICANT CHANGE UP (ref 40–120)
ALT FLD-CCNC: 18 U/L — SIGNIFICANT CHANGE UP (ref 4–41)
ANION GAP SERPL CALC-SCNC: 13 MMOL/L — SIGNIFICANT CHANGE UP (ref 7–14)
AST SERPL-CCNC: 24 U/L — SIGNIFICANT CHANGE UP (ref 4–40)
BILIRUB SERPL-MCNC: 0.2 MG/DL — SIGNIFICANT CHANGE UP (ref 0.2–1.2)
BLD GP AB SCN SERPL QL: NEGATIVE — SIGNIFICANT CHANGE UP
BUN SERPL-MCNC: 11 MG/DL — SIGNIFICANT CHANGE UP (ref 7–23)
CALCIUM SERPL-MCNC: 9.7 MG/DL — SIGNIFICANT CHANGE UP (ref 8.4–10.5)
CHLORIDE SERPL-SCNC: 99 MMOL/L — SIGNIFICANT CHANGE UP (ref 98–107)
CO2 SERPL-SCNC: 23 MMOL/L — SIGNIFICANT CHANGE UP (ref 22–31)
CREAT SERPL-MCNC: 0.42 MG/DL — LOW (ref 0.5–1.3)
EGFR: 150 ML/MIN/1.73M2 — SIGNIFICANT CHANGE UP
GLUCOSE SERPL-MCNC: 80 MG/DL — SIGNIFICANT CHANGE UP (ref 70–99)
HCT VFR BLD CALC: 46.6 % — SIGNIFICANT CHANGE UP (ref 39–50)
HGB BLD-MCNC: 14.5 G/DL — SIGNIFICANT CHANGE UP (ref 13–17)
MCHC RBC-ENTMCNC: 27.9 PG — SIGNIFICANT CHANGE UP (ref 27–34)
MCHC RBC-ENTMCNC: 31.1 GM/DL — LOW (ref 32–36)
MCV RBC AUTO: 89.8 FL — SIGNIFICANT CHANGE UP (ref 80–100)
NRBC # BLD: 0 /100 WBCS — SIGNIFICANT CHANGE UP (ref 0–0)
NRBC # FLD: 0 K/UL — SIGNIFICANT CHANGE UP (ref 0–0)
PLATELET # BLD AUTO: 157 K/UL — SIGNIFICANT CHANGE UP (ref 150–400)
POTASSIUM SERPL-MCNC: 4.8 MMOL/L — SIGNIFICANT CHANGE UP (ref 3.5–5.3)
POTASSIUM SERPL-SCNC: 4.8 MMOL/L — SIGNIFICANT CHANGE UP (ref 3.5–5.3)
PROT SERPL-MCNC: 7.4 G/DL — SIGNIFICANT CHANGE UP (ref 6–8.3)
RBC # BLD: 5.19 M/UL — SIGNIFICANT CHANGE UP (ref 4.2–5.8)
RBC # FLD: 13.6 % — SIGNIFICANT CHANGE UP (ref 10.3–14.5)
RH IG SCN BLD-IMP: POSITIVE — SIGNIFICANT CHANGE UP
SODIUM SERPL-SCNC: 135 MMOL/L — SIGNIFICANT CHANGE UP (ref 135–145)
WBC # BLD: 6.62 K/UL — SIGNIFICANT CHANGE UP (ref 3.8–10.5)
WBC # FLD AUTO: 6.62 K/UL — SIGNIFICANT CHANGE UP (ref 3.8–10.5)

## 2023-01-12 RX ORDER — ERGOCALCIFEROL 1.25 MG/1
2 CAPSULE ORAL
Qty: 0 | Refills: 0 | DISCHARGE

## 2023-01-12 NOTE — H&P PST ADULT - PROBLEM SELECTOR PLAN 2
[facility] made aware of [facility] PCR testing requirement prior to this surgery.  Pt verbalized understanding

## 2023-01-12 NOTE — H&P PST ADULT - PROBLEM SELECTOR PLAN 1
Scheduled for removal and replacement of baclofen pump, possible catheter on 1/20/23  Written & verbal preop instructions,  [facility]  verbalized good understanding.   Medical eval requesting - pending copy of report

## 2023-01-12 NOTE — H&P PST ADULT - TEMPERATURE IN FAHRENHEIT (DEGREES F)
97.9 Tazorac Counseling:  Patient advised that medication is irritating and drying.  Patient may need to apply sparingly and wash off after an hour before eventually leaving it on overnight.  The patient verbalized understanding of the proper use and possible adverse effects of tazorac.  All of the patient's questions and concerns were addressed.

## 2023-01-12 NOTE — H&P PST ADULT - RESPIRATORY AND THORAX COMMENTS
Albuterol nebs as needed for cough/wheezing. not used in few months. Denies  h/o admission for respiratory distress/wheezing/cough.

## 2023-01-12 NOTE — H&P PST ADULT - HISTORY OF PRESENT ILLNESS
26yo non-verbal, non-ambulatory male from [facility] facility accompanied by staff from [facility] facility & pt father - Daniel.  Pt has medical h/o Cerebral palsy, Cortical blindness, Spastic quadriplegia, Profound intellectual disability, and Scoliosis. Pt presents today for presurgical evaluation for Removal and replacement of baclofen pump, possible catheter.

## 2023-01-19 ENCOUNTER — TRANSCRIPTION ENCOUNTER (OUTPATIENT)
Age: 28
End: 2023-01-19

## 2023-01-19 NOTE — ASU PATIENT PROFILE, ADULT - AS SC BRADEN SENSORY
(4) no impairment unable to fully assess, however patient able to adjust gown and hold pen, no noted deficits at this time based on functional observation

## 2023-01-20 ENCOUNTER — TRANSCRIPTION ENCOUNTER (OUTPATIENT)
Age: 28
End: 2023-01-20

## 2023-01-20 ENCOUNTER — OUTPATIENT (OUTPATIENT)
Dept: OUTPATIENT SERVICES | Facility: HOSPITAL | Age: 28
LOS: 1 days | Discharge: HOME | End: 2023-01-20

## 2023-01-20 VITALS — SYSTOLIC BLOOD PRESSURE: 154 MMHG | RESPIRATION RATE: 16 BRPM | HEART RATE: 109 BPM | OXYGEN SATURATION: 97 %

## 2023-01-20 VITALS
SYSTOLIC BLOOD PRESSURE: 135 MMHG | WEIGHT: 93.04 LBS | DIASTOLIC BLOOD PRESSURE: 90 MMHG | HEART RATE: 85 BPM | TEMPERATURE: 100 F | RESPIRATION RATE: 88 BRPM | OXYGEN SATURATION: 95 % | HEIGHT: 66 IN

## 2023-01-20 DIAGNOSIS — R25.2 CRAMP AND SPASM: ICD-10-CM

## 2023-01-20 DIAGNOSIS — Z98.890 OTHER SPECIFIED POSTPROCEDURAL STATES: Chronic | ICD-10-CM

## 2023-01-20 DIAGNOSIS — G80.0 SPASTIC QUADRIPLEGIC CEREBRAL PALSY: Chronic | ICD-10-CM

## 2023-01-20 DEVICE — SYNCROMED II INFUSION PUMP 20CC: Type: IMPLANTABLE DEVICE | Status: FUNCTIONAL

## 2023-01-20 DEVICE — SURGIFOAM PAD 8CM X 12.5CM X 2MM (100C): Type: IMPLANTABLE DEVICE | Status: FUNCTIONAL

## 2023-01-20 DEVICE — SURGIFLO MATRIX WITH THROMBIN KIT: Type: IMPLANTABLE DEVICE | Status: FUNCTIONAL

## 2023-01-20 RX ORDER — LEVETIRACETAM 250 MG/1
2 TABLET, FILM COATED ORAL
Qty: 0 | Refills: 0 | DISCHARGE

## 2023-01-20 RX ORDER — FENTANYL CITRATE 50 UG/ML
25 INJECTION INTRAVENOUS
Refills: 0 | Status: DISCONTINUED | OUTPATIENT
Start: 2023-01-20 | End: 2023-01-20

## 2023-01-20 RX ORDER — LORATADINE 10 MG/1
10 TABLET ORAL DAILY
Refills: 0 | Status: DISCONTINUED | OUTPATIENT
Start: 2023-01-20 | End: 2023-02-03

## 2023-01-20 RX ORDER — LANOLIN/MINERAL OIL
1 LOTION (ML) TOPICAL
Qty: 0 | Refills: 0 | DISCHARGE

## 2023-01-20 RX ORDER — SODIUM CHLORIDE 9 MG/ML
500 INJECTION, SOLUTION INTRAVENOUS ONCE
Refills: 0 | Status: COMPLETED | OUTPATIENT
Start: 2023-01-20 | End: 2023-01-20

## 2023-01-20 RX ORDER — CLINDAMYCIN PHOSPHATE GEL USP, 1% 10 MG/G
1 GEL TOPICAL
Qty: 0 | Refills: 0 | DISCHARGE

## 2023-01-20 RX ORDER — SALIVA SUBSTITUTE COMB NO.11 351 MG
0 POWDER IN PACKET (EA) MUCOUS MEMBRANE
Qty: 0 | Refills: 0 | DISCHARGE

## 2023-01-20 RX ORDER — ERGOCALCIFEROL 1.25 MG/1
0 CAPSULE ORAL
Qty: 0 | Refills: 0 | DISCHARGE

## 2023-01-20 RX ORDER — ACETAMINOPHEN 500 MG
650 TABLET ORAL EVERY 6 HOURS
Refills: 0 | Status: DISCONTINUED | OUTPATIENT
Start: 2023-01-20 | End: 2023-02-03

## 2023-01-20 RX ORDER — OXYCODONE HYDROCHLORIDE 5 MG/1
5 TABLET ORAL
Qty: 40 | Refills: 0
Start: 2023-01-20 | End: 2023-01-21

## 2023-01-20 RX ORDER — MIDAZOLAM HYDROCHLORIDE 1 MG/ML
0 INJECTION, SOLUTION INTRAMUSCULAR; INTRAVENOUS
Qty: 0 | Refills: 0 | DISCHARGE

## 2023-01-20 RX ORDER — SALICYLIC ACID 0.5 %
1 CLEANSER (GRAM) TOPICAL
Qty: 0 | Refills: 0 | DISCHARGE

## 2023-01-20 RX ORDER — METOCLOPRAMIDE HCL 10 MG
5 TABLET ORAL DAILY
Refills: 0 | Status: DISCONTINUED | OUTPATIENT
Start: 2023-01-20 | End: 2023-02-03

## 2023-01-20 RX ORDER — LACTULOSE 10 G/15ML
30 SOLUTION ORAL
Qty: 0 | Refills: 0 | DISCHARGE

## 2023-01-20 RX ORDER — SIMETHICONE 80 MG/1
2 TABLET, CHEWABLE ORAL
Qty: 0 | Refills: 0 | DISCHARGE

## 2023-01-20 RX ORDER — ONDANSETRON 8 MG/1
4 TABLET, FILM COATED ORAL ONCE
Refills: 0 | Status: DISCONTINUED | OUTPATIENT
Start: 2023-01-20 | End: 2023-02-03

## 2023-01-20 RX ORDER — CALCIUM CARBONATE 500(1250)
1250 TABLET ORAL DAILY
Refills: 0 | Status: DISCONTINUED | OUTPATIENT
Start: 2023-01-20 | End: 2023-02-03

## 2023-01-20 RX ORDER — SENNA PLUS 8.6 MG/1
2 TABLET ORAL
Qty: 0 | Refills: 0 | DISCHARGE

## 2023-01-20 RX ORDER — SODIUM CHLORIDE 9 MG/ML
1000 INJECTION INTRAMUSCULAR; INTRAVENOUS; SUBCUTANEOUS
Refills: 0 | Status: DISCONTINUED | OUTPATIENT
Start: 2023-01-20 | End: 2023-02-03

## 2023-01-20 RX ORDER — PSEUDOEPHEDRINE HCL 30 MG
2 TABLET ORAL
Qty: 0 | Refills: 0 | DISCHARGE

## 2023-01-20 RX ORDER — KETOCONAZOLE 20 MG/G
0 AEROSOL, FOAM TOPICAL
Qty: 0 | Refills: 0 | DISCHARGE

## 2023-01-20 RX ORDER — LEVETIRACETAM 250 MG/1
500 TABLET, FILM COATED ORAL
Refills: 0 | Status: DISCONTINUED | OUTPATIENT
Start: 2023-01-20 | End: 2023-02-03

## 2023-01-20 RX ORDER — METOCLOPRAMIDE HCL 10 MG
1 TABLET ORAL
Qty: 0 | Refills: 0 | DISCHARGE

## 2023-01-20 RX ORDER — IBUPROFEN 200 MG
20 TABLET ORAL
Qty: 0 | Refills: 0 | DISCHARGE

## 2023-01-20 RX ORDER — ALBUTEROL 90 UG/1
3 AEROSOL, METERED ORAL
Qty: 0 | Refills: 0 | DISCHARGE

## 2023-01-20 RX ORDER — LORATADINE 10 MG/1
1 TABLET ORAL
Qty: 0 | Refills: 0 | DISCHARGE

## 2023-01-20 RX ORDER — CALCIUM CARBONATE 500(1250)
5 TABLET ORAL
Qty: 0 | Refills: 0 | DISCHARGE

## 2023-01-20 RX ORDER — ACETAMINOPHEN 500 MG
20 TABLET ORAL
Qty: 0 | Refills: 0 | DISCHARGE

## 2023-01-20 RX ORDER — POLYETHYLENE GLYCOL 3350 17 G/17G
17 POWDER, FOR SOLUTION ORAL
Qty: 0 | Refills: 0 | DISCHARGE

## 2023-01-20 RX ADMIN — SODIUM CHLORIDE 1000 MILLILITER(S): 9 INJECTION, SOLUTION INTRAVENOUS at 13:32

## 2023-01-20 NOTE — ASU DISCHARGE PLAN (ADULT/PEDIATRIC) - ASU DC SPECIAL INSTRUCTIONSFT
- You had surgery on 1/20/23. The surgery you had was replacement of baclofen pump reservoir.     - Remove bandage from incision site on post op day 3 if it was not removed by the surgical team prior to discharge. Once removed, incision site does not need a bandage or ointment on it. If you have steri strips (small, skinny beige strips), they will eventually fall off over time. Do not pull at steri strips. If steri strips are more than assisted off, you may remove them. Do not touch or scratch incision to prevent infection.    - If your incision is closed with clear sutures, these are absorbable and will dissolve over time. If you see metallic staples or black stitches, these will need to be removed in the office 7-14 days after surgery. Your surgeon will tell you at your follow up appt when your sutures/staples will be removed, if applicable.  Do not pick or scratch at incision.     - Shower daily with shampoo/soap on post operative day 4 (1/24/23) Avoid long soaks and do not submerge incision in water (no baths.) Allow soap and water to run over the incision. Pat incision area dry with clean towel- do not scrub. Please shower regularly to ensure incision stays clean to avoid post operative infections.  You may have a body shower daily, as long as your head incision is covered by a shower cap and does not get wet until post op day 4.     - Notify your surgeon if you notice increased redness, drainage or your incision area opening.     - Return to ER immediately for high fevers, severe headache, vomiting, lethargy or weakness    - Please call your neurosurgeon following discharge to make follow up appointment in 1 week after discharge unless otherwise specified. See contact information.    - Prescription post operative medication has been sent to [facility] in the hospital. All post operative prescriptions should be picked up before departing the hospital. You can also take over the counter tylenol for pain as needed.     - Ambulate as tolerate. Continue with all "activities of daily living." Avoid strenuous activity or heavy lifting until cleared for additional activity at your follow up appointment. You cannot drive while taking narcotics (oxycodone, valium, etc.)     - Do not return to work or school until cleared by your neurosurgeon at your follow up visit unless specified to you during your hospital stay    - Do not take any blood thinning medications such as aspirin, motrin, ibuprofen, warfarin, coumadin, plavix, heparin, lovenox, Xarelto, Eliquis etc. until cleared by your neurosurgeon    - Surgery, anesthesia, and pain medications can cause constipation. Please take over the counter stool softeners daily until regular bowel movements are achieved. Examples include Miralax, Senna, Colace, Milk of Magnesia, or Dulcolax suppositories. Please consult drug store pharmacist or pediatrician if there are question about dosing if the patient is pediatric. - You had surgery on 1/20/23. The surgery you had was replacement of baclofen pump reservoir.     - Remove bandage from incision site on post op day 4. Once removed, incision site does not need a bandage or ointment on it. If you have steri strips (small, skinny beige strips), they will eventually fall off over time. Do not pull at steri strips. If steri strips are more than longterm off, you may remove them. Do not touch or scratch incision to prevent infection.    - If your incision is closed with clear sutures, these are absorbable and will dissolve over time. If you see metallic staples or black stitches, these will need to be removed in the office 7-14 days after surgery. Your surgeon will tell you at your follow up appt when your sutures/staples will be removed, if applicable.  Do not pick or scratch at incision.     - Shower daily with shampoo/soap on post operative day 4 (1/24/23) Avoid long soaks and do not submerge incision in water (no baths.) Allow soap and water to run over the incision. Pat incision area dry with clean towel- do not scrub. Please shower regularly to ensure incision stays clean to avoid post operative infections.     - Notify your surgeon if you notice increased redness, drainage or your incision area opening.     - Return to ER immediately for high fevers, severe headache, vomiting, lethargy or weakness    - Please call your neurosurgeon following discharge to make follow up appointment in 1 week after discharge unless otherwise specified. See contact information.    - Prescription post operative medication has been sent to [facility] in the hospital. All post operative prescriptions should be picked up before departing the hospital. You can also take over the counter Tylenol for pain as needed.     - Ambulate as tolerate. Continue with all "activities of daily living." Avoid strenuous activity or heavy lifting until cleared for additional activity at your follow up appointment. You cannot drive while taking narcotics (oxycodone, valium, etc.)     - Do not return to work or school until cleared by your neurosurgeon at your follow up visit unless specified to you during your hospital stay    - Do not take any blood thinning medications such as aspirin, motrin, ibuprofen, warfarin, coumadin, plavix, heparin, lovenox, Xarelto, Eliquis etc. until cleared by your neurosurgeon    - Surgery, anesthesia, and pain medications can cause constipation. Please take over the counter stool softeners daily until regular bowel movements are achieved. Examples include Miralax, Senna, Colace, Milk of Magnesia, or Dulcolax suppositories. Please consult drug store pharmacist or pediatrician if there are question about dosing if the patient is pediatric.

## 2023-01-20 NOTE — ASU DISCHARGE PLAN (ADULT/PEDIATRIC) - CARE PROVIDER_API CALL
Tony Keller)  Neurosurgery; Pediatric Neurosurgery  [address] [place] [address], [address]  [place], [place] [id number]  Phone: [phone]  Fax: [phone]  Follow Up Time: 1 week

## 2024-01-01 NOTE — H&P PST ADULT - RESPIRATORY AND THORAX COMMENTS
information could not be obtained Albuterol nebs as needed for cough/wheezing. RN denies any recent use of inhaler or admission Albuterol nebs as needed for cough/wheezing. SURINDER Contrersa denies pt h/o admission for respiratory distress/wheezing/cough.

## 2024-09-30 NOTE — ED ADULT NURSE NOTE - FALL HARM RISK TYPE OF ASSESSMENT
Carl Rosario  Oncology Social Work Encounter    [x] Med-Onc [facility] [] Med-Onc [facility] [] Med-Onc [facility] [] Rad-Onc [facility] [] Rad-Onc [facility] [] Rad-Onc [facility] [] Rad-Onc [facility] [] Breast Center [] [facility]      Patient: Fortunato Kowalski    Encounter Type:    [] Initial SW Encounter  [] Patient Initiated  [] Referral  [] Distress/PHQ Screening  [x] Other:      Concern(s)/Barrier(s) to Care:     Narrative: SW offer condolences.  Spouse shared that the chemo was too much for him.  SW acknowledged she had been a great caregiver and pt had been very appreciative of her her.  SW shared while it was unexpected, it seems like it was a peaceful death - in his sleep.  Spouse stated I should hve known as he was sleeping too much but also shared the nurse had just left -   SW reiterated, see the nurse didn't know either.   Pt to be cremated and spouse provided date of Oct 12 at 2:00 [facility]     Referral/Handouts:         Plan: continue to provide ongoing support post pt death.      Admission

## (undated) DEVICE — ELCTR GROUNDING PAD ADULT COVIDIEN

## (undated) DEVICE — SOL IRR POUR H2O 1500ML

## (undated) DEVICE — POSITIONER FOAM EGG CRATE ULNAR 2PCS (PINK)

## (undated) DEVICE — NDL HYPO SAFE 22G X 1.5" (BLACK)

## (undated) DEVICE — SUT MONOCRYL 4-0 27" PS-2 UNDYED

## (undated) DEVICE — LABELS BLANK W PEN

## (undated) DEVICE — SYR LUER LOK 3CC

## (undated) DEVICE — SUT PDS II 2-0 27" SH

## (undated) DEVICE — WARMING BLANKET FULL ADULT

## (undated) DEVICE — STAPLER SKIN VISI-STAT 35 WIDE

## (undated) DEVICE — DRAPE CAMERA ENDOMATE

## (undated) DEVICE — DRSG XEROFORM 1 X 8"

## (undated) DEVICE — KIT INJ LIORESAL INTRATHECAL 1 AMP OF 40MG/20ML

## (undated) DEVICE — POSITIONER STRAP ARMBOARD VELCRO TS-30

## (undated) DEVICE — SOL IRR POUR NS 0.9% 1500ML

## (undated) DEVICE — PACK NEURO MINOR

## (undated) DEVICE — VENODYNE/SCD SLEEVE CALF MEDIUM

## (undated) DEVICE — SUT SILK 0 24" SH DA

## (undated) DEVICE — DRAPE LAPAROTOMY TRANSVERSE

## (undated) DEVICE — Device

## (undated) DEVICE — ELCTR GROUNDING PAD INFANT COVIDIEN

## (undated) DEVICE — DRSG TAPE HYPAFIX 4"

## (undated) DEVICE — SUT SILK 2-0 30" SH

## (undated) DEVICE — PASSER TUBE PD 46CM

## (undated) DEVICE — DRSG BENZOIN 0.6CC

## (undated) DEVICE — CANISTER DISPOSABLE THIN WALL 3000CC

## (undated) DEVICE — SUT VICRYL 0 18" CT-1 UNDYED (POP-OFF)

## (undated) DEVICE — DRSG CURITY GAUZE SPONGE 4 X 4" 12-PLY

## (undated) DEVICE — DRSG TELFA 3 X 8

## (undated) DEVICE — INTRO SHEATH INTEGRA PD 61CM

## (undated) DEVICE — SUT PROLENE 2-0 30" CT-1

## (undated) DEVICE — CLIPPER BLADE NEURO (BLUE)

## (undated) DEVICE — PREP DURAPREP 26CC

## (undated) DEVICE — SUT VICRYL 3-0 18" SH UNDYED (POP-OFF)

## (undated) DEVICE — MARKING PEN W RULER

## (undated) DEVICE — SYR LUER LOK 20CC

## (undated) DEVICE — DRAPE TOWEL BLUE 17" X 24"

## (undated) DEVICE — DRAPE TOWEL BLUE STICKY

## (undated) DEVICE — DRAPE 3/4 SHEET 52X76"

## (undated) DEVICE — SUT SILK 2-0 18" TIES